# Patient Record
Sex: MALE | Race: WHITE | NOT HISPANIC OR LATINO | Employment: FULL TIME | URBAN - METROPOLITAN AREA
[De-identification: names, ages, dates, MRNs, and addresses within clinical notes are randomized per-mention and may not be internally consistent; named-entity substitution may affect disease eponyms.]

---

## 2018-10-08 ENCOUNTER — OFFICE VISIT (OUTPATIENT)
Dept: INTERNAL MEDICINE | Facility: CLINIC | Age: 38
End: 2018-10-08
Payer: COMMERCIAL

## 2018-10-08 VITALS
SYSTOLIC BLOOD PRESSURE: 128 MMHG | OXYGEN SATURATION: 98 % | HEART RATE: 97 BPM | BODY MASS INDEX: 28.68 KG/M2 | DIASTOLIC BLOOD PRESSURE: 80 MMHG | WEIGHT: 177.69 LBS | TEMPERATURE: 98 F

## 2018-10-08 DIAGNOSIS — M62.830 MUSCLE SPASM OF BACK: Primary | ICD-10-CM

## 2018-10-08 PROCEDURE — 99999 PR PBB SHADOW E&M-EST. PATIENT-LVL IV: CPT | Mod: PBBFAC,,, | Performed by: NURSE PRACTITIONER

## 2018-10-08 PROCEDURE — 99213 OFFICE O/P EST LOW 20 MIN: CPT | Mod: S$GLB,,, | Performed by: NURSE PRACTITIONER

## 2018-10-08 PROCEDURE — 3008F BODY MASS INDEX DOCD: CPT | Mod: CPTII,S$GLB,, | Performed by: NURSE PRACTITIONER

## 2018-10-08 RX ORDER — CYCLOBENZAPRINE HCL 10 MG
10 TABLET ORAL 3 TIMES DAILY PRN
Qty: 30 TABLET | Refills: 0 | Status: SHIPPED | OUTPATIENT
Start: 2018-10-08 | End: 2018-10-18

## 2018-10-08 RX ORDER — METHYLPREDNISOLONE 4 MG/1
TABLET ORAL
Qty: 1 PACKAGE | Refills: 0 | Status: SHIPPED | OUTPATIENT
Start: 2018-10-08

## 2018-10-08 NOTE — PROGRESS NOTES
Subjective:       Patient ID: Jamel Topete is a 37 y.o. male.    Chief Complaint: Back Pain    Mr. Topete presents today for low back pain that radiates to thighs bilaterally. He fell off a ladder in 2008 and has had intermittent back pain, that causes him to seek medical attention, 2-3 yearly since the incident.       Review of Systems   Constitutional: Negative for fever.   HENT: Negative for facial swelling.    Eyes: Negative for visual disturbance.   Respiratory: Negative for shortness of breath.    Cardiovascular: Negative for chest pain.   Gastrointestinal: Negative for diarrhea, nausea and vomiting.   Genitourinary: Negative for dysuria.   Musculoskeletal: Positive for back pain, gait problem and myalgias.   Skin: Negative for rash.   Neurological: Negative for headaches.   Psychiatric/Behavioral: Negative for confusion.       Objective:      Physical Exam   Constitutional: He is oriented to person, place, and time. He appears well-developed and well-nourished. No distress.   HENT:   Head: Atraumatic.   Eyes: No scleral icterus.   Neck: Normal range of motion.   Cardiovascular: Normal rate, regular rhythm and normal heart sounds.   Pulmonary/Chest: Effort normal and breath sounds normal. No stridor. No respiratory distress. He has no wheezes. He has no rales.   Musculoskeletal:        Lumbar back: He exhibits decreased range of motion, tenderness, pain and spasm. He exhibits no bony tenderness, no swelling, no edema, no deformity, no laceration and normal pulse.   Neurological: He is alert and oriented to person, place, and time.   Skin: Skin is warm and dry. He is not diaphoretic.   Psychiatric: He has a normal mood and affect. His behavior is normal.   Nursing note and vitals reviewed.      Assessment:       1. Muscle spasm of back        Plan:   1. Muscle spasm of back  - Ambulatory consult to [facility]  - methylPREDNISolone (MEDROL DOSEPACK) 4 mg tablet; use as directed  Dispense: 1 Package;  Refill: 0  - cyclobenzaprine (FLEXERIL) 10 MG tablet; Take 1 tablet (10 mg total) by mouth 3 (three) times daily as needed for Muscle spasms.  Dispense: 30 tablet; Refill: 0      Pt has been given instructions populated from etouches database and has verbalized understanding of the after visit summary and information contained wherein.    Follow up with a primary care provider. May go to ER for acute shortness of breath, lightheadedness, fever, or any other emergent complaints or changes in condition.

## 2019-06-27 ENCOUNTER — TELEPHONE (OUTPATIENT)
Dept: ORTHOPEDICS | Facility: CLINIC | Age: 39
End: 2019-06-27

## 2019-06-27 DIAGNOSIS — M51.36 DDD (DEGENERATIVE DISC DISEASE), LUMBAR: Primary | ICD-10-CM

## 2019-07-03 ENCOUNTER — OFFICE VISIT (OUTPATIENT)
Dept: ORTHOPEDICS | Facility: CLINIC | Age: 39
End: 2019-07-03
Payer: COMMERCIAL

## 2019-07-03 ENCOUNTER — HOSPITAL ENCOUNTER (OUTPATIENT)
Dept: RADIOLOGY | Facility: HOSPITAL | Age: 39
Discharge: HOME | End: 2019-07-03
Attending: PHYSICIAN ASSISTANT
Payer: COMMERCIAL

## 2019-07-03 VITALS — WEIGHT: 174.63 LBS | HEIGHT: 67 IN | BODY MASS INDEX: 27.41 KG/M2

## 2019-07-03 DIAGNOSIS — M54.16 LUMBAR RADICULOPATHY: Primary | ICD-10-CM

## 2019-07-03 DIAGNOSIS — M51.36 DDD (DEGENERATIVE DISC DISEASE), LUMBAR: ICD-10-CM

## 2019-07-03 DIAGNOSIS — M54.5 ACUTE BILATERAL LOW BACK PAIN, WITH SCIATICA PRESENCE UNSPECIFIED: ICD-10-CM

## 2019-07-03 PROCEDURE — 72100 XR LUMBAR SPINE AP AND LAT WITH FLEX/EXT: ICD-10-PCS | Mod: 26,,, | Performed by: RADIOLOGY

## 2019-07-03 PROCEDURE — 99204 OFFICE O/P NEW MOD 45 MIN: CPT | Mod: S$GLB,,, | Performed by: PHYSICIAN ASSISTANT

## 2019-07-03 PROCEDURE — 99999 PR PBB SHADOW E&M-EST. PATIENT-LVL III: CPT | Mod: PBBFAC,,, | Performed by: PHYSICIAN ASSISTANT

## 2019-07-03 PROCEDURE — 99204 PR OFFICE/OUTPT VISIT, NEW, LEVL IV, 45-59 MIN: ICD-10-PCS | Mod: S$GLB,,, | Performed by: PHYSICIAN ASSISTANT

## 2019-07-03 PROCEDURE — 3008F BODY MASS INDEX DOCD: CPT | Mod: CPTII,S$GLB,, | Performed by: PHYSICIAN ASSISTANT

## 2019-07-03 PROCEDURE — 72100 X-RAY EXAM L-S SPINE 2/3 VWS: CPT | Mod: TC

## 2019-07-03 PROCEDURE — 72120 X-RAY BEND ONLY L-S SPINE: CPT | Mod: 26,,, | Performed by: RADIOLOGY

## 2019-07-03 PROCEDURE — 72120 XR LUMBAR SPINE AP AND LAT WITH FLEX/EXT: ICD-10-PCS | Mod: 26,,, | Performed by: RADIOLOGY

## 2019-07-03 PROCEDURE — 3008F PR BODY MASS INDEX (BMI) DOCUMENTED: ICD-10-PCS | Mod: CPTII,S$GLB,, | Performed by: PHYSICIAN ASSISTANT

## 2019-07-03 PROCEDURE — 72100 X-RAY EXAM L-S SPINE 2/3 VWS: CPT | Mod: 26,,, | Performed by: RADIOLOGY

## 2019-07-03 PROCEDURE — 99999 PR PBB SHADOW E&M-EST. PATIENT-LVL III: ICD-10-PCS | Mod: PBBFAC,,, | Performed by: PHYSICIAN ASSISTANT

## 2019-07-03 RX ORDER — METHOCARBAMOL 750 MG/1
750 TABLET, FILM COATED ORAL 3 TIMES DAILY
Qty: 60 TABLET | Refills: 0 | Status: SHIPPED | OUTPATIENT
Start: 2019-07-03 | End: 2019-07-23

## 2019-07-03 NOTE — PROGRESS NOTES
DATE: 7/3/2019  PATIENT: Jamel Topete    Supervising Physician: Juan Rose M.D.    CHIEF COMPLAINT: low back pain    HISTORY:  Jamel Topete is a 38 y.o. male  here for initial evaluation of low back and bilateral leg pain (Back - 2, Leg - 2).  The pain has been present for about 2 weeks.  He was lifting his daughter out of the pool when the pain began.  The pain was so severe he could barely walk.  He went to an urgent care and they gave him steroids, flexeril and tramadol.  His pain has progressively improved since then but he still has a lingering pain in the low back and left buttock.  He has had similar symptoms before but it is happening more frequently.  The patient describes the pain as sharp.  The pain is worse with sitting and with activity and improved by rest or lying down. There is associated numbness and tingling. There is no subjective weakness. Prior treatments have included a medrol dose pack, tramadol and flexeril, but no PT, ESIs or surgery.    The patient denies myelopathic symptoms such as handwriting changes or difficulty with buttons/coins/keys. Denies perineal paresthesias, bowel/bladder dysfunction.    PAST MEDICAL/SURGICAL HISTORY:  History reviewed. No pertinent past medical history.  History reviewed. No pertinent surgical history.    Medications:   Current Outpatient Medications on File Prior to Visit   Medication Sig Dispense Refill    STELARA 45 mg/0.5 mL Syrg syringe       folic acid (FOLVITE) 1 MG tablet take 1 tablet by mouth once daily EXCEPT ON WEDNESDAY  0    methylPREDNISolone (MEDROL DOSEPACK) 4 mg tablet use as directed 1 Package 0     No current facility-administered medications on file prior to visit.        Social History:   Social History     Socioeconomic History    Marital status:      Spouse name: Not on file    Number of children: Not on file    Years of education: Not on file    Highest education level: Not on file   Occupational History  "   Not on file   Social Needs    Financial resource strain: Not on file    Food insecurity:     Worry: Not on file     Inability: Not on file    Transportation needs:     Medical: Not on file     Non-medical: Not on file   Tobacco Use    Smoking status: Never Smoker    Smokeless tobacco: Never Used   Substance and Sexual Activity    Alcohol use: No    Drug use: Not on file    Sexual activity: Not on file   Lifestyle    Physical activity:     Days per week: Not on file     Minutes per session: Not on file    Stress: Not on file   Relationships    Social connections:     Talks on phone: Not on file     Gets together: Not on file     Attends Orthodoxy service: Not on file     Active member of club or organization: Not on file     Attends meetings of clubs or organizations: Not on file     Relationship status: Not on file   Other Topics Concern    Not on file   Social History Narrative    Not on file       REVIEW OF SYSTEMS:  Constitution: Negative. Negative for chills, fever and night sweats.   Cardiovascular: Negative for chest pain and syncope.   Respiratory: Negative for cough and shortness of breath.   Gastrointestinal: See HPI. Negative for nausea/vomiting. Negative for abdominal pain.  Genitourinary: See HPI. Negative for discoloration or dysuria.  Skin: Negative for dry skin, itching and rash.   Hematologic/Lymphatic: Negative for bleeding problem. Does not bruise/bleed easily.   Musculoskeletal: Negative for falls and muscle weakness.   Neurological: See HPI. No seizures.   Endocrine: Negative for polydipsia, polyphagia and polyuria.   Allergic/Immunologic: Negative for hives and persistent infections.     EXAM:  Ht 5' 6.5" (1.689 m)   Wt 79.2 kg (174 lb 9.7 oz)   BMI 27.76 kg/m²     General: The patient is a very pleasant 38 y.o. male in no apparent distress, the patient is oriented to person, place and time.  Psych: Normal mood and affect  HEENT: Vision grossly intact, hearing intact to the " spoken word.  Lungs: Respirations unlabored.  Gait: Normal station and gait, no difficulty with toe or heel walk.   Skin: Dorsal lumbar skin negative for rashes, lesions, hairy patches and surgical scars. There is mild left sided lumbar tenderness to palpation.  Range of motion: Lumbar range of motion is acceptable.  Spinal Balance: Global saggital and coronal spinal balance acceptable, not significant for scoliosis and kyphosis.  Musculoskeletal: No pain with the range of motion of the bilateral hips. No trochanteric tenderness to palpation.  Vascular: Bilateral lower extremities warm and well perfused, dorsalis pedis pulses 2+ bilaterally.  Neurological: Normal strength and tone in all major motor groups in the bilateral lower extremities. Normal sensation to light touch in the L2-S1 dermatomes bilaterally.  Deep tendon reflexes symmetric 2+ in the bilateral lower extremities.  Negative Babinski bilaterally. Straight leg raise negative bilaterally.    IMAGING:      Today I personally reviewed AP, Lat and Flex/Ex  upright L-spine films that demonstrate normal disc spacing and alignment.   There is limbus vertebra at L5.      Body mass index is 27.76 kg/m².    No results found for: HGBA1C        ASSESSMENT/PLAN:    Jamel was seen today for low-back pain and leg pain.    Diagnoses and all orders for this visit:    Lumbar radiculopathy  -     Ambulatory Referral to Physical/Occupational Therapy    Acute bilateral low back pain, with sciatica presence unspecified  -     Ambulatory Referral to Physical/Occupational Therapy    Other orders  -     methocarbamol (ROBAXIN) 750 MG Tab; Take 1 tablet (750 mg total) by mouth 3 (three) times daily. As needed for muscle spasms for 60 doses      The patient's pain is improving.  Referral for PT placed today.  He cannot take NSAIDs due to allergic reaction.  Follow up after therapy if symptoms persist.  We will consider an MRI at that time.       Follow up if symptoms worsen or  fail to improve.

## 2019-07-31 ENCOUNTER — CLINICAL SUPPORT (OUTPATIENT)
Dept: REHABILITATION | Facility: HOSPITAL | Age: 39
End: 2019-07-31
Payer: COMMERCIAL

## 2019-07-31 DIAGNOSIS — M54.41 ACUTE MIDLINE LOW BACK PAIN WITH BILATERAL SCIATICA: ICD-10-CM

## 2019-07-31 DIAGNOSIS — M54.42 ACUTE MIDLINE LOW BACK PAIN WITH BILATERAL SCIATICA: ICD-10-CM

## 2019-07-31 PROBLEM — M54.50 LOW BACK PAIN: Status: ACTIVE | Noted: 2019-07-31

## 2019-07-31 PROCEDURE — 97161 PT EVAL LOW COMPLEX 20 MIN: CPT | Mod: PO

## 2019-07-31 PROCEDURE — 97110 THERAPEUTIC EXERCISES: CPT | Mod: PO

## 2019-07-31 NOTE — PLAN OF CARE
[facility]  Physical Therapy Initial Evaluation    Name: Jamel Topete  Clinic Number: [id number]    Therapy Diagnosis:   Encounter Diagnosis   Name Primary?    Acute midline low back pain with bilateral sciatica      Physician: Janet Romo PA-C    Physician Orders: PT Eval and Treat low back pain w/ radiculopathy  Medical Diagnosis from Referral: Lumbar radiculopathy, acute bilateral low back pain w/ sciatica presence unspecified  Evaluation Date: 7/31/2019  Authorization Period Expiration: 12/31/19  Plan of Care Expiration: 10/15/19  Visit # / Visits authorized: 1/ 30    Time In: 800  Time Out: 900  Total Billable Time: 60 minutes    Precautions: Standard    Subjective   Date of onset: 6/17/19  History of current condition - Jamel reports: acute onset of LBP w/ bilateral LE radiculopathy on 6/17/19 when lifting his daughter out of the pool. Pt reports for the following 3 days he had a lot of pain walking and spent most of the day in bed, had to drive back from his vacation which was difficult d/t extended period of sitting. Pt endorses bilateral groin pain for 4-5 days after event, denies changes in bowel or bladder function. Pt received steroid taper which helped his sx after 1.5 weeks. Pt admits similar episodes of pain (3x/within the past year). Pt works as an  and sits most of the day but helps his son out as  2 days /week. Pt would like to prevent further episodes of pain from occurring, be able to independently manage sx, and tolerate running/ wrestling practice w/out pain.      No past medical history on file.  Jamel Topete  has no past surgical history on file.    Jamel has a current medication list which includes the following prescription(s): folic acid, methylprednisolone, and stelara.    Review of patient's allergies indicates:   Allergen Reactions    Asa [aspirin] Other (See Comments)     congestion    Advil [ibuprofen] Other (See Comments)  "    congestion        Imaging, X-ray: 6/27/19:  Alignment: Well-maintained.    Vertebrae: Vertebral body heights are maintained.  No suspicious appearing lytic or blastic lesions.    Discs and facets: There is no disc space narrowing.  Mild anterior osteophyte noted at the L4-L5 space.  Facet joints are well-aligned and unremarkable.    Other: There is a normal bowel gas pattern.  The sacroiliac joints and femoroacetabular joints are unremarkable.      Impression       No significant abnormalities.         Prior Therapy: n/a  Social History: Pt lives w/ his family  Occupation: [employer]   Prior Level of Function: Pt independent with all ADLs, job responsibilities and participating in regular physical activity  Current Level of Function: Pt limited w/ ADLs, job responsibilities and avoiding physical activity    Pain:  Current 0/10, worst 5/10, best 0/10   Location: bilateral back  and groin   Description: Aching, Dull, Burning, Throbbing, Grabbing and Shooting  Aggravating Factors: Sitting, Standing, Bending, Lifting and Getting out of bed/chair  Easing Factors: relaxation, pain medication and rest    Pts goals: preventing happening again, independently manage sx and increase core strength    Objective     LUMBAR SPINE AROM:   Flexion: 80% P!   Extension: 50% discomfort   Left Sidebend: WNL "cramp sensation on L"   Right Sidebend: WNL "cramp sensation on R"   Left Rotation: WNL   Right Rotation: WNL     SEGMENTAL MOBILITY: PA glides to L5 reproduce radicular sx to R groin. Pt reports no increase in radicular sx w/ PA to L5 once cued to activate paraspinals    LOWER EXTREMITY PROM: WNL, restricted into IR bilat    LOWER EXTREMITY STRENGTH:   Left Right   Quadriceps 5/5 5/5   Hamstrings 5/5 5/5     Iliopsoas 4/5 4/5   PGM 4-/5 4-/5   Hip IR 4-/5 4+/5   Hip ER 4+/5 4+/5   Hip Ext 4-/5 4-/5     Special Tests:   Left Right   Slump (+) (+)   SLR (+) (-)   SI thrust (+) for R sided SI pain  CHATO (-)  FADDIR (-)  Pelvic " gap/compress (-)    TREATMENT   Treatment Time In: 845  Treatment Time Out: 900  Total Treatment time separate from Evaluation: 15 minutes    Jamel received therapeutic exercises to develop strength, endurance, ROM, flexibility, posture and core stabilization for 15 minutes including:    Pelvic tilt 2x20  LTRs 2x20  Bridge 2x10  Side plank clamshells    Home Exercises and Patient Education Provided    Education provided re: activity modification, red flags req immediate medical attention (saddle anesthesia, changes in bowel/bladder function), roll of PT, POC    Written Home Exercises Provided: All exercises performed during today's treatment were printed and given to pt  Exercises were reviewed and Jamel was able to demonstrate them prior to the end of the session.   Pt received a written copy of exercises to perform at home. Jamel demonstrated good  understanding of the education provided.     Assessment   Jamel is a 38 y.o. male referred to outpatient Physical Therapy with a medical diagnosis of low back pain w/ radiculopathy. Pt presents with decreased strength, decreased ROM, decreased flexibility, faulty posture, and increased pain. Due to impairments, pt is unable to perform job responsibilities, play with his children or tolerate job responsibilities.     Pt prognosis is Excellent.   Pt will benefit from skilled outpatient Physical Therapy to address the deficits stated above and in the chart below, provide pt/family education, and to maximize pt's level of independence.     Plan of care discussed with patient: Yes  Pt's spiritual, cultural and educational needs considered and patient is agreeable to the plan of care and goals as stated below:     Anticipated Barriers for therapy: n/a    Medical Necessity is demonstrated by the following  History  Co-morbidities and personal factors that may impact the plan of care Co-morbidities:   n/a    Personal Factors:   no deficits     low   Examination  Body Structures  and Functions, activity limitations and participation restrictions that may impact the plan of care Body Regions:   back    Body Systems:    ROM  strength  balance  gait  transfers    Participation Restrictions:   Play with chidren    Activity limitations:   Learning and applying knowledge  no deficits    General Tasks and Commands  no deficits    Communication  no deficits    Mobility  lifting and carrying objects  walking  driving (bike, car, motorcycle)    Self care  looking after one's health    Domestic Life  shopping  cooking  Assisting others    Interactions/Relationships  family relationships    Life Areas  employment    Community and Social Life  no deficits         low   Clinical Presentation stable and uncomplicated low   Decision Making/ Complexity Score: low     Goals:  Short Term Goals: 3 weeks   1. Pt will be independent with HEP and report compliance at least 4 days/week  2. Pt will be able to  20# off the floor 10x w/ sound body mechanics reporting less than 2/10 pain  3. Pt will be able to participate in 1 hour of wrestling practice, making modifications when necessary reporting less than 2/10 pain    Long Term Goals: 10 weeks   1. Pt will be able to run for 30 minutes on level surface reporting less than 2/10 pain  2. Pt will be able to participating in wrestling practice at [facility] reporting less than 2/10 pain  3. Pt will be able to play w/ his children at [facility] for 2 hours reporting less than 2/10 pain    Plan   Plan of care Certification: 7/31/2019 to 10/15/19.    Outpatient Physical Therapy 2 times weekly for 10 weeks to include the following interventions: Cervical/Lumbar Traction, Electrical Stimulation TENS, Gait Training, Manual Therapy, Moist Heat/ Ice, Neuromuscular Re-ed, Patient Education, Self Care, Therapeutic Activites, Therapeutic Exercise, Ultrasound and Dry Needling.     Abundio Bourne, PT

## 2019-08-08 NOTE — PROGRESS NOTES
"  Physical Therapy Daily Treatment Note     Name: Jamel Topete  [facility] Number: [id number]    Therapy Diagnosis:   Encounter Diagnosis   Name Primary?    Acute midline low back pain with bilateral sciatica      Physician: Janet Romo PA-C    Visit Date: 8/9/2019       Physician Orders: PT Eval and Treat low back pain w/ radiculopathy  Medical Diagnosis from Referral: Lumbar radiculopathy, acute bilateral low back pain w/ sciatica presence unspecified  Evaluation Date: 7/31/2019  Authorization Period Expiration: 12/31/19  Plan of Care Expiration: 10/15/19  Visit # / Visits authorized: 2/ 30     Time In: 9:57 am  Time Out: 11:00 am  Total Billable Time: 63 minutes (TE-4)     Precautions: Standard    Subjective     Pt reports: he is doing well and has only been having minimal pain. Pt stated the last time he felt radicular pain in his legs was in mid June. Pt stated he mainly wants to decrease his risk of it happening again.   He was somewhat compliant with home exercise program., stating he did it twice since his last session.   Response to previous treatment: no adverse effect  Functional change:     Pain: 1/10  Location: B lower back     Objective     Jamel received therapeutic exercises to develop strength, endurance, ROM, posture and core stabilization for 63 minutes including:      Supine:     Pelvic tilt: 2x20  LTRs: 2x20  Bridge: 3x10  Hip adduction isometric c/ ball : 5'' hold , 2 x 10   Braced marching : 2 x 10   DKTC c/ green swiss ball : 2 x 10     Sidelying:    Side plank with clamshell : 2 x 10 B  Thoracic rotation/open books: 2 x 10 B    Quadruped:     Cat / camel: x 15   Prayer stretch: 3 x 30"     Prone:    Prone prop on forearms : 3'  Prone press ups : 5'' hold x 10    Standing:    Hip hinge against wall: 2 x 10        Home Exercises Provided and Patient Education Provided     Education provided:   - Pt instructed to continue prior HEP and addition of new HEP provided today  - Proper form for " squatting lifting objects off ground    Written Home Exercises Provided: yes.  Exercises were reviewed and Jamel was able to demonstrate them prior to the end of the session.  Jamel demonstrated good  understanding of the education provided.     See EMR under Patient Instructions for exercises provided 8/9/2019.      Assessment     Pt demonstrated a good tolerance to session and progressions made today. Pt tolerated flexion based activities well and was able to complete with no onset of symptoms. Pt also tolerated prone activities well for extension , required some rest breaks due to stiffness with position although no pain or radicular symptoms reported. Pt had slight difficulty with hip/lumbar dissociation which improved with cues. Pt understanding of proper position and form for squatting to pick his daughter up off the ground .   Jamel is progressing well towards his goals.   Pt prognosis is Excellent.     Pt will continue to benefit from skilled outpatient physical therapy to address the deficits listed in the problem list box on initial evaluation, provide pt/family education and to maximize pt's level of independence in the home and community environment.     Pt's spiritual, cultural and educational needs considered and pt agreeable to plan of care and goals.    Anticipated barriers to physical therapy: none    Goals:   Short Term Goals: 3 weeks   1. Pt will be independent with HEP and report compliance at least 4 days/week  2. Pt will be able to  20# off the floor 10x w/ sound body mechanics reporting less than 2/10 pain  3. Pt will be able to participate in 1 hour of wrestling practice, making modifications when necessary reporting less than 2/10 pain     Long Term Goals: 10 weeks   1. Pt will be able to run for 30 minutes on level surface reporting less than 2/10 pain  2. Pt will be able to participating in wrestling practice at [facility] reporting less than 2/10 pain  3. Pt will be able to play w/ his  children at [facility] for 2 hours reporting less than 2/10 pain    Plan     Continue to progress lumbar , hip and core strengthening. Next : shuttle leg press, anti-rotation press    Zuleika Owen, PTA

## 2019-08-09 ENCOUNTER — CLINICAL SUPPORT (OUTPATIENT)
Dept: REHABILITATION | Facility: HOSPITAL | Age: 39
End: 2019-08-09
Payer: COMMERCIAL

## 2019-08-09 DIAGNOSIS — M54.41 ACUTE MIDLINE LOW BACK PAIN WITH BILATERAL SCIATICA: ICD-10-CM

## 2019-08-09 DIAGNOSIS — M54.42 ACUTE MIDLINE LOW BACK PAIN WITH BILATERAL SCIATICA: ICD-10-CM

## 2019-08-09 PROCEDURE — 97110 THERAPEUTIC EXERCISES: CPT | Mod: PO

## 2019-08-16 ENCOUNTER — CLINICAL SUPPORT (OUTPATIENT)
Dept: REHABILITATION | Facility: HOSPITAL | Age: 39
End: 2019-08-16
Payer: COMMERCIAL

## 2019-08-16 DIAGNOSIS — M54.41 ACUTE MIDLINE LOW BACK PAIN WITH BILATERAL SCIATICA: ICD-10-CM

## 2019-08-16 DIAGNOSIS — M54.42 ACUTE MIDLINE LOW BACK PAIN WITH BILATERAL SCIATICA: ICD-10-CM

## 2019-08-16 PROCEDURE — 97110 THERAPEUTIC EXERCISES: CPT | Mod: PO

## 2019-08-16 NOTE — PROGRESS NOTES
"  Physical Therapy Daily Treatment Note     Name: Jamel Topete  Clinic Number: [id number]    Therapy Diagnosis:   Encounter Diagnosis   Name Primary?    Acute midline low back pain with bilateral sciatica      Physician: Janet Romo PA-C    Visit Date: 8/16/2019       Physician Orders: PT Eval and Treat low back pain w/ radiculopathy  Medical Diagnosis from Referral: Lumbar radiculopathy, acute bilateral low back pain w/ sciatica presence unspecified  Evaluation Date: 7/31/2019  Authorization Period Expiration: 12/31/19  Plan of Care Expiration: 10/15/19  Visit # / Visits authorized: 3/ 30     Time In: 10:00 am  Time Out: 11:00 am  Total Billable Time: 60 minutes (TE-4)     Precautions: Standard    Subjective     Pt reports: he has been doing well with HEP, decreased pain. Pt reports at times tough to get to all exercises d/t work. Spending a lot of time on computer   He was somewhat compliant with home exercise program., stating he did it twice since his last session.   Response to previous treatment: no adverse effect  Functional change:     Pain: 1/10  Location: B lower back     Objective     Jamel received therapeutic exercises to develop strength, endurance, ROM, posture and core stabilization for 63 minutes including:      Supine:     Pelvic tilt: 2x20  LTRs: 2x20  Bridge: 3x10  Hip adduction isometric c/ ball : 5'' hold , 2 x 10   Deadbug : 2 x 10   DKTC c/ green swiss ball : 2 x 10   Thoracic extension over 1/2 FR 3 min    Sidelying:    Side plank with clamshell : 2 x 10 B  Thoracic rotation/open books: 2 x 10 B    Quadruped:     Cat / camel: x 15   Prayer stretch: 3 x 30"   Thoracic rotation in quadruped 2x10    Prone:    Prone prop on forearms : 3'  Prone press ups : 5'' hold x 10    Standing:    Hip hinge against wall: 2 x 10  Thoracic extension at wall 5x5"  Thoracic rotation at wall 2x10  Doorway pec stretch 2x30 sec    Home Exercises Provided and Patient Education Provided     Education " provided:   - Pt instructed to continue prior HEP and addition of new HEP provided today  - Proper form for squatting lifting objects off ground    Written Home Exercises Provided: yes.  Exercises were reviewed and Jamel was able to demonstrate them prior to the end of the session.  Jamel demonstrated good  understanding of the education provided.     See EMR under Patient Instructions for exercises provided 8/9/2019.      Assessment     Pt presents w/ good carryover of exercise form. Required minimal VCs for hip hinge form. Pt given HEP focusing on what to do if he has to do a lot of work sitting at a desk. Pt educated on desk ergonomics. Will continue to progress ROM/strengthening as tolerated.   Jamel is progressing well towards his goals.   Pt prognosis is Excellent.     Pt will continue to benefit from skilled outpatient physical therapy to address the deficits listed in the problem list box on initial evaluation, provide pt/family education and to maximize pt's level of independence in the home and community environment.     Pt's spiritual, cultural and educational needs considered and pt agreeable to plan of care and goals.    Anticipated barriers to physical therapy: none    Goals:   Short Term Goals: 3 weeks   1. Pt will be independent with HEP and report compliance at least 4 days/week  2. Pt will be able to  20# off the floor 10x w/ sound body mechanics reporting less than 2/10 pain  3. Pt will be able to participate in 1 hour of wrestling practice, making modifications when necessary reporting less than 2/10 pain     Long Term Goals: 10 weeks   1. Pt will be able to run for 30 minutes on level surface reporting less than 2/10 pain  2. Pt will be able to participating in wrestling practice at [facility] reporting less than 2/10 pain  3. Pt will be able to play w/ his children at [facility] for 2 hours reporting less than 2/10 pain    Plan     Continue to progress lumbar , hip and core strengthening. Next :  shuttle leg press, anti-rotation press    Abundio Bourne, PT

## 2019-09-05 NOTE — PROGRESS NOTES
Physical Therapy Daily Treatment Note     Name: Jamel Topete  Clinic Number: [id number]    Therapy Diagnosis:   Encounter Diagnosis   Name Primary?    Acute midline low back pain with bilateral sciatica      Physician: Janet Romo PA-C    Visit Date: 9/6/2019       Physician Orders: PT Eval and Treat low back pain w/ radiculopathy  Medical Diagnosis from Referral: Lumbar radiculopathy, acute bilateral low back pain w/ sciatica presence unspecified  Evaluation Date: 7/31/2019  Authorization Period Expiration: 12/31/19  Plan of Care Expiration: 10/15/19  Visit # / Visits authorized: 4/ 30     Time In: 9:00 am  Time Out: 10:00 am  Total Billable Time: 60 minutes (TE-4)     Precautions: Standard    Subjective     Pt reports: he has tried to take regular breaks from his desk. Feeling like he is near PLOF, wants to increase physical activity by getting back to running but worried about shin splints  He was  More compliant with home exercise program.,  Response to previous treatment: no adverse effect  Functional change: more comfortable picking things off the floor    Pain: 1/10  Location: R lower back     Objective     LUMBAR SPINE AROM:   Flexion: 80%    Extension: 50%   Left Sidebend: WNL   Right Sidebend: WNL discomfort   Left Rotation: WNL   Right Rotation: WNL      SEGMENTAL MOBILITY: PA glides to L5 reproduce radicular sx to R groin. Pt reports no increase in radicular sx w/ PA to L5 once cued to activate paraspinals     LOWER EXTREMITY PROM: WNL, restricted into IR bilat     LOWER EXTREMITY STRENGTH:    Left Right   Quadriceps 5/5 5/5   Hamstrings 5/5 5/5      Iliopsoas 4/5 4/5   PGM 4-/5 4-/5   Hip IR 4-/5 4+/5   Hip ER 4+/5 4+/5   Hip Ext 4/5 4/5      Special Tests:    Left Right   Slump (+) (+)   SLR (+) (-)     Jamel received therapeutic exercises to develop strength, endurance, ROM, posture and core stabilization for 63 minutes including:      Supine:     Pelvic tilt: 2x20  LTRs: 2x20  Bridge w/ BTB:  "3x10  Hip adduction isometric c/ ball : 5'' hold , 2 x 10   Deadbug : 2 x 10   DKTC c/ green swiss ball : 2 x 10   Thoracic extension over 1/2 FR 3 min  Supine crawling    Sidelying:    Side plank with clamshell : 2 x 10 B  Thoracic rotation/open books: 2 x 10 B    Quadruped:     Cat / camel: x 15   Prayer stretch: 3 x 30"   Thoracic rotation in quadruped 2x10    Prone:    Prone prop on forearms : 3'  Prone press ups : 5'' hold x 10    Standing:    Hip hinge against wall BTB at knees: 2 x 10  Hip hinge w/ red sports cord 2x10  Hip hinge w/ red sports cord & 25# weight 2x10  BTB side steps 2 laps  BTB monster walk 2 laps  Running form match strike 2x5  Jogging working on james w/ metronome cue 5 min  Inch worm 2x10 ft  Anti rot press w/ squat 2x10  Thoracic extension at wall 5x5"  Thoracic rotation at wall 2x10  Doorway pec stretch 2x30 sec    Home Exercises Provided and Patient Education Provided     Education provided:   - Pt instructed to continue prior HEP and addition of new HEP provided today  - Proper form for squatting lifting objects off ground    Written Home Exercises Provided: yes.  Exercises were reviewed and Jamel was able to demonstrate them prior to the end of the session.  Jamel demonstrated good  understanding of the education provided.     See EMR under Patient Instructions for exercises provided 8/9/2019.      Assessment     Pt demonstrates improved hip hinge form, requires fewer cues to achieve posterior weight shift. Pt benefits from continued core / hip strengthening. Discussed running form with pt, james and proper warm up prior to going for run. Pt demonstrates good understanding. Potential d/c after next treatment depending on how independent HEP goes  Jamel is progressing well towards his goals.   Pt prognosis is Excellent.     Pt will continue to benefit from skilled outpatient physical therapy to address the deficits listed in the problem list box on initial evaluation, provide " pt/family education and to maximize pt's level of independence in the home and community environment.     Pt's spiritual, cultural and educational needs considered and pt agreeable to plan of care and goals.    Anticipated barriers to physical therapy: none    Goals:   Short Term Goals: 3 weeks   1. Pt will be independent with HEP and report compliance at least 4 days/week - met  2. Pt will be able to  20# off the floor 10x w/ sound body mechanics reporting less than 2/10 pain - met  3. Pt will be able to participate in 1 hour of wrestling practice, making modifications when necessary reporting less than 2/10 pain -----progressing not met 9/6/2019     Long Term Goals: 10 weeks   1. Pt will be able to run for 30 minutes on level surface reporting less than 2/10 pain -----progressing not met 9/6/2019  2. Pt will be able to participating in wrestling practice at [facility] reporting less than 2/10 pain -----progressing not met 9/6/2019\  3. Pt will be able to play w/ his children at [facility] for 2 hours reporting less than 2/10 pain -----progressing not met 9/6/2019    Plan     Continue to progress lumbar , hip and core strengthening. Next : shuttle leg press, anti-rotation press    Abundio Bourne, PT

## 2019-09-06 ENCOUNTER — CLINICAL SUPPORT (OUTPATIENT)
Dept: REHABILITATION | Facility: HOSPITAL | Age: 39
End: 2019-09-06
Payer: COMMERCIAL

## 2019-09-06 DIAGNOSIS — M54.42 ACUTE MIDLINE LOW BACK PAIN WITH BILATERAL SCIATICA: ICD-10-CM

## 2019-09-06 DIAGNOSIS — M54.41 ACUTE MIDLINE LOW BACK PAIN WITH BILATERAL SCIATICA: ICD-10-CM

## 2019-09-06 PROCEDURE — 97110 THERAPEUTIC EXERCISES: CPT | Mod: PO

## 2021-03-19 ENCOUNTER — IMMUNIZATION (OUTPATIENT)
Dept: FAMILY MEDICINE | Facility: CLINIC | Age: 41
End: 2021-03-19
Payer: COMMERCIAL

## 2021-03-19 DIAGNOSIS — Z23 NEED FOR VACCINATION: Primary | ICD-10-CM

## 2021-03-19 PROCEDURE — 91300 COVID-19, MRNA, LNP-S, PF, 30 MCG/0.3 ML DOSE VACCINE: CPT | Mod: PBBFAC | Performed by: FAMILY MEDICINE

## 2021-04-09 ENCOUNTER — IMMUNIZATION (OUTPATIENT)
Dept: FAMILY MEDICINE | Facility: CLINIC | Age: 41
End: 2021-04-09
Payer: COMMERCIAL

## 2021-04-09 DIAGNOSIS — Z23 NEED FOR VACCINATION: Primary | ICD-10-CM

## 2021-04-09 PROCEDURE — 0002A COVID-19, MRNA, LNP-S, PF, 30 MCG/0.3 ML DOSE VACCINE: ICD-10-PCS | Mod: CV19,S$GLB,, | Performed by: FAMILY MEDICINE

## 2021-04-09 PROCEDURE — 0002A COVID-19, MRNA, LNP-S, PF, 30 MCG/0.3 ML DOSE VACCINE: CPT | Mod: CV19,S$GLB,, | Performed by: FAMILY MEDICINE

## 2021-04-09 PROCEDURE — 91300 COVID-19, MRNA, LNP-S, PF, 30 MCG/0.3 ML DOSE VACCINE: ICD-10-PCS | Mod: S$GLB,,, | Performed by: FAMILY MEDICINE

## 2021-04-09 PROCEDURE — 91300 COVID-19, MRNA, LNP-S, PF, 30 MCG/0.3 ML DOSE VACCINE: CPT | Mod: S$GLB,,, | Performed by: FAMILY MEDICINE

## 2024-09-03 ENCOUNTER — OFFICE VISIT (OUTPATIENT)
Dept: PRIMARY CARE CLINIC | Facility: CLINIC | Age: 44
End: 2024-09-03
Payer: COMMERCIAL

## 2024-09-03 ENCOUNTER — LAB VISIT (OUTPATIENT)
Dept: LAB | Facility: HOSPITAL | Age: 44
End: 2024-09-03
Attending: FAMILY MEDICINE
Payer: COMMERCIAL

## 2024-09-03 VITALS
RESPIRATION RATE: 20 BRPM | WEIGHT: 183.44 LBS | TEMPERATURE: 98 F | HEIGHT: 67 IN | SYSTOLIC BLOOD PRESSURE: 132 MMHG | BODY MASS INDEX: 28.79 KG/M2 | OXYGEN SATURATION: 98 % | HEART RATE: 69 BPM | DIASTOLIC BLOOD PRESSURE: 78 MMHG

## 2024-09-03 DIAGNOSIS — Z12.83 SKIN CANCER SCREENING: ICD-10-CM

## 2024-09-03 DIAGNOSIS — L40.9 PSORIASIS: ICD-10-CM

## 2024-09-03 DIAGNOSIS — N50.82 SCROTAL PAIN: ICD-10-CM

## 2024-09-03 DIAGNOSIS — Z00.00 WELL ADULT EXAM: Primary | ICD-10-CM

## 2024-09-03 DIAGNOSIS — Z00.00 WELL ADULT EXAM: ICD-10-CM

## 2024-09-03 DIAGNOSIS — F32.1 CURRENT MODERATE EPISODE OF MAJOR DEPRESSIVE DISORDER WITHOUT PRIOR EPISODE: ICD-10-CM

## 2024-09-03 LAB
ALBUMIN SERPL BCP-MCNC: 4.2 G/DL (ref 3.5–5.2)
ALP SERPL-CCNC: 51 U/L (ref 55–135)
ALT SERPL W/O P-5'-P-CCNC: 35 U/L (ref 10–44)
ANION GAP SERPL CALC-SCNC: 10 MMOL/L (ref 8–16)
AST SERPL-CCNC: 21 U/L (ref 10–40)
BILIRUB SERPL-MCNC: 0.7 MG/DL (ref 0.1–1)
BUN SERPL-MCNC: 18 MG/DL (ref 6–20)
CALCIUM SERPL-MCNC: 9.4 MG/DL (ref 8.7–10.5)
CHLORIDE SERPL-SCNC: 107 MMOL/L (ref 95–110)
CHOLEST SERPL-MCNC: 205 MG/DL (ref 120–199)
CHOLEST/HDLC SERPL: 3.7 {RATIO} (ref 2–5)
CO2 SERPL-SCNC: 20 MMOL/L (ref 23–29)
COMPLEXED PSA SERPL-MCNC: 0.62 NG/ML (ref 0–4)
CREAT SERPL-MCNC: 0.9 MG/DL (ref 0.5–1.4)
ERYTHROCYTE [DISTWIDTH] IN BLOOD BY AUTOMATED COUNT: 13.4 % (ref 11.5–14.5)
EST. GFR  (NO RACE VARIABLE): >60 ML/MIN/1.73 M^2
ESTIMATED AVG GLUCOSE: 103 MG/DL (ref 68–131)
GLUCOSE SERPL-MCNC: 98 MG/DL (ref 70–110)
HBA1C MFR BLD: 5.2 % (ref 4–5.6)
HCT VFR BLD AUTO: 45.1 % (ref 40–54)
HDLC SERPL-MCNC: 55 MG/DL (ref 40–75)
HDLC SERPL: 26.8 % (ref 20–50)
HGB BLD-MCNC: 14.9 G/DL (ref 14–18)
LDLC SERPL CALC-MCNC: 123.8 MG/DL (ref 63–159)
MCH RBC QN AUTO: 30 PG (ref 27–31)
MCHC RBC AUTO-ENTMCNC: 33 G/DL (ref 32–36)
MCV RBC AUTO: 91 FL (ref 82–98)
NONHDLC SERPL-MCNC: 150 MG/DL
PLATELET # BLD AUTO: 267 K/UL (ref 150–450)
PMV BLD AUTO: 10.6 FL (ref 9.2–12.9)
POTASSIUM SERPL-SCNC: 3.8 MMOL/L (ref 3.5–5.1)
PROT SERPL-MCNC: 7.1 G/DL (ref 6–8.4)
RBC # BLD AUTO: 4.97 M/UL (ref 4.6–6.2)
SODIUM SERPL-SCNC: 137 MMOL/L (ref 136–145)
TRIGL SERPL-MCNC: 131 MG/DL (ref 30–150)
TSH SERPL DL<=0.005 MIU/L-ACNC: 1.4 UIU/ML (ref 0.4–4)
WBC # BLD AUTO: 8.15 K/UL (ref 3.9–12.7)

## 2024-09-03 PROCEDURE — 83036 HEMOGLOBIN GLYCOSYLATED A1C: CPT | Performed by: FAMILY MEDICINE

## 2024-09-03 PROCEDURE — 85027 COMPLETE CBC AUTOMATED: CPT | Performed by: FAMILY MEDICINE

## 2024-09-03 PROCEDURE — 80053 COMPREHEN METABOLIC PANEL: CPT | Performed by: FAMILY MEDICINE

## 2024-09-03 PROCEDURE — 3075F SYST BP GE 130 - 139MM HG: CPT | Mod: CPTII,S$GLB,, | Performed by: FAMILY MEDICINE

## 2024-09-03 PROCEDURE — 99999 PR PBB SHADOW E&M-NEW PATIENT-LVL V: CPT | Mod: PBBFAC,,, | Performed by: FAMILY MEDICINE

## 2024-09-03 PROCEDURE — 1159F MED LIST DOCD IN RCRD: CPT | Mod: CPTII,S$GLB,, | Performed by: FAMILY MEDICINE

## 2024-09-03 PROCEDURE — 84153 ASSAY OF PSA TOTAL: CPT | Performed by: FAMILY MEDICINE

## 2024-09-03 PROCEDURE — 3008F BODY MASS INDEX DOCD: CPT | Mod: CPTII,S$GLB,, | Performed by: FAMILY MEDICINE

## 2024-09-03 PROCEDURE — 1160F RVW MEDS BY RX/DR IN RCRD: CPT | Mod: CPTII,S$GLB,, | Performed by: FAMILY MEDICINE

## 2024-09-03 PROCEDURE — 84443 ASSAY THYROID STIM HORMONE: CPT | Performed by: FAMILY MEDICINE

## 2024-09-03 PROCEDURE — 3044F HG A1C LEVEL LT 7.0%: CPT | Mod: CPTII,S$GLB,, | Performed by: FAMILY MEDICINE

## 2024-09-03 PROCEDURE — 99214 OFFICE O/P EST MOD 30 MIN: CPT | Mod: 25,S$GLB,, | Performed by: FAMILY MEDICINE

## 2024-09-03 PROCEDURE — 80061 LIPID PANEL: CPT | Performed by: FAMILY MEDICINE

## 2024-09-03 PROCEDURE — 99396 PREV VISIT EST AGE 40-64: CPT | Mod: S$GLB,,, | Performed by: FAMILY MEDICINE

## 2024-09-03 PROCEDURE — 3078F DIAST BP <80 MM HG: CPT | Mod: CPTII,S$GLB,, | Performed by: FAMILY MEDICINE

## 2024-09-03 RX ORDER — FINASTERIDE 1 MG/1
1 TABLET, FILM COATED ORAL DAILY
COMMUNITY
Start: 2023-08-01

## 2024-09-03 RX ORDER — CLOBETASOL PROPIONATE 0.5 MG/G
1 OINTMENT TOPICAL
COMMUNITY

## 2024-09-03 RX ORDER — SERTRALINE HYDROCHLORIDE 50 MG/1
50 TABLET, FILM COATED ORAL DAILY
COMMUNITY
Start: 2024-06-01 | End: 2024-09-03

## 2024-09-03 RX ORDER — FLUOXETINE HYDROCHLORIDE 20 MG/1
20 CAPSULE ORAL DAILY
Qty: 30 CAPSULE | Refills: 1 | Status: SHIPPED | OUTPATIENT
Start: 2024-09-03 | End: 2025-09-03

## 2024-09-03 NOTE — PROGRESS NOTES
"      /78 (BP Location: Left arm, Patient Position: Sitting)   Pulse 69   Temp 97.7 °F (36.5 °C) (Oral)   Resp 20   Ht 5' 6.5" (1.689 m)   Wt 83.2 kg (183 lb 6.8 oz)   SpO2 98%   BMI 29.16 kg/m²       ===========              Jamel Topete is a 43 y.o. male     here for    Annual exam.    Health maintenance reviewed with patient in detail inc any recent labs and studies and needs for future screening labs.  Age-appropriate vaccines and other age-appropriate screening studies reviewed with patient in detail.  Sleep health reviewed with patient.  Skin health regarding possible skin cancer screening reviewed with patient.  General regularity of bowel movements and urinations reviewed with patient including any possibility of urine leakage.  Vision screening reviewed with patient.        Patient Active Problem List   Diagnosis    Low back pain    Current moderate episode of major depressive disorder without prior episode    Psoriasis    Scrotal pain       SURGICAL AND MEDICAL HISTORY: updated and reviewed.  History reviewed. No pertinent surgical history.  ALLERGIES updated and reviewed.  Review of patient's allergies indicates:   Allergen Reactions    Asa [aspirin] Other (See Comments)     congestion    Advil [ibuprofen] Other (See Comments)     congestion       CURRENT OUTPATIENT MEDICATIONS updated and reviewed    Current Outpatient Medications:     clobetasol 0.05% (TEMOVATE) 0.05 % Oint, Apply 1 Application topically as needed., Disp: , Rfl:     finasteride (PROPECIA) 1 mg tablet, Take 1 tablet by mouth once daily., Disp: , Rfl:     STELARA 45 mg/0.5 mL Syrg syringe, , Disp: , Rfl:     diphth,pertus,acell,,tetanus (BOOSTRIX) 2.5-8-5 Lf-mcg-Lf/0.5mL Syrg injection, Inject 0.5 mLs into the muscle once. for 1 dose, Disp: 0.5 mL, Rfl: 0    FLUoxetine 20 MG capsule, Take 1 capsule (20 mg total) by mouth once daily., Disp: 30 capsule, Rfl: 1    Review of Systems   Constitutional:  Negative for activity " "change, appetite change, chills, diaphoresis, fatigue, fever and unexpected weight change.   HENT:  Negative for congestion, ear discharge, ear pain, facial swelling, hearing loss, nosebleeds, postnasal drip, rhinorrhea, sinus pressure, sneezing, sore throat, tinnitus, trouble swallowing and voice change.    Eyes:  Negative for photophobia, pain, discharge, redness, itching and visual disturbance.   Respiratory:  Negative for cough, chest tightness, shortness of breath and wheezing.    Cardiovascular:  Negative for chest pain, palpitations and leg swelling.   Gastrointestinal:  Negative for abdominal distention, abdominal pain, anal bleeding, blood in stool, constipation, diarrhea, nausea, rectal pain and vomiting.   Endocrine: Negative for cold intolerance, heat intolerance, polydipsia, polyphagia and polyuria.   Genitourinary:  Negative for difficulty urinating, dysuria and flank pain.   Musculoskeletal:  Negative for arthralgias, back pain, joint swelling, myalgias and neck pain.   Skin:  Negative for rash.   Neurological:  Negative for dizziness, tremors, seizures, syncope, speech difficulty, weakness, light-headedness, numbness and headaches.   Psychiatric/Behavioral:  Negative for behavioral problems, confusion, decreased concentration, dysphoric mood, sleep disturbance and suicidal ideas. The patient is not nervous/anxious and is not hyperactive.        /78 (BP Location: Left arm, Patient Position: Sitting)   Pulse 69   Temp 97.7 °F (36.5 °C) (Oral)   Resp 20   Ht 5' 6.5" (1.689 m)   Wt 83.2 kg (183 lb 6.8 oz)   SpO2 98%   BMI 29.16 kg/m²   Physical Exam  Vitals and nursing note reviewed.   Constitutional:       General: He is not in acute distress.     Appearance: Normal appearance. He is well-developed. He is not ill-appearing or toxic-appearing.   HENT:      Head: Normocephalic and atraumatic.      Right Ear: Tympanic membrane, ear canal and external ear normal.      Left Ear: Tympanic " membrane, ear canal and external ear normal.      Nose: Nose normal.      Mouth/Throat:      Lips: Pink.      Mouth: Mucous membranes are moist.      Pharynx: No oropharyngeal exudate or posterior oropharyngeal erythema.   Eyes:      General: No scleral icterus.        Right eye: No discharge.         Left eye: No discharge.      Extraocular Movements: Extraocular movements intact.      Conjunctiva/sclera: Conjunctivae normal.   Cardiovascular:      Rate and Rhythm: Normal rate and regular rhythm.      Pulses: Normal pulses.      Heart sounds: Normal heart sounds. No murmur heard.  Pulmonary:      Effort: Pulmonary effort is normal. No respiratory distress.      Breath sounds: Normal breath sounds. No wheezing or rales.   Abdominal:      General: Bowel sounds are normal. There is no distension.      Palpations: Abdomen is soft. There is no mass.      Tenderness: There is no abdominal tenderness. There is no right CVA tenderness, left CVA tenderness, guarding or rebound.      Hernia: No hernia is present.   Musculoskeletal:      Cervical back: Normal range of motion and neck supple. No rigidity or tenderness.   Lymphadenopathy:      Cervical: No cervical adenopathy.   Skin:     General: Skin is warm and dry.   Neurological:      General: No focal deficit present.      Mental Status: He is alert. Mental status is at baseline.   Psychiatric:         Mood and Affect: Mood normal.         Behavior: Behavior normal. Behavior is cooperative.         ASSESSMENT/PLAN    Diagnoses and all orders for this visit:    Well adult exam  -     PSA, Screening; Future  -     CBC Without Differential; Future  -     Comprehensive Metabolic Panel; Future  -     Hemoglobin A1C; Future  -     Lipid Panel; Future  -     TSH; Future        Skin cancer screening  -     Ambulatory referral/consult to Dermatology; Future              Most recent some lab results reviewed with patient.  Any new prescription medications gone over in detail  including reason for taking the medication, most common possible side effects and possible costs, etcetera.    Chronic conditions updated. Other than changes or additions as above, cont current medications and maintain follow-up with specialists if indicated.     Dyllan Lopez MD  A dictation device was used to produce this document. Use of such devices sometimes results in grammatical errors or replacement of words that sound similarly.      ==================    ========================  Patient presents with acute complaints today in the setting of a wellness office visit.  The below portion documents patient's acute complaint(s)  addressed within the primary visit for annual preventative visit    HPI: Pt complains of depressive symptoms.  Denies suicidal or homicidal ideations.  Has had struggles with this for some time.  He has lot of stress.  He has a wife and 2 young sons who are healthy.    He also has some scrotal discomfort.  No trauma.  No penis discharge.  No dysuria.  No perineal discomfort.    He also has chronic psoriasis.      Patient queried and denies any further complaints      Patient Active Problem List   Diagnosis    Low back pain    Current moderate episode of major depressive disorder without prior episode    Psoriasis    Scrotal pain       SURGICAL AND MEDICAL HISTORY: updated and reviewed.  History reviewed. No pertinent surgical history.  ALLERGIES updated and reviewed.  Review of patient's allergies indicates:   Allergen Reactions    Asa [aspirin] Other (See Comments)     congestion    Advil [ibuprofen] Other (See Comments)     congestion       CURRENT OUTPATIENT MEDICATIONS updated and reviewed    Current Outpatient Medications:     clobetasol 0.05% (TEMOVATE) 0.05 % Oint, Apply 1 Application topically as needed., Disp: , Rfl:     finasteride (PROPECIA) 1 mg tablet, Take 1 tablet by mouth once daily., Disp: , Rfl:     STELARA 45 mg/0.5 mL Syrg syringe, , Disp: , Rfl:      "diphth,pertus,acell,,tetanus (BOOSTRIX) 2.5-8-5 Lf-mcg-Lf/0.5mL Syrg injection, Inject 0.5 mLs into the muscle once. for 1 dose, Disp: 0.5 mL, Rfl: 0    FLUoxetine 20 MG capsule, Take 1 capsule (20 mg total) by mouth once daily., Disp: 30 capsule, Rfl: 1    Review of Systems   Constitutional:  Negative for activity change, appetite change, chills, diaphoresis, fatigue, fever and unexpected weight change.   HENT:  Negative for congestion, ear discharge, ear pain, facial swelling, hearing loss, nosebleeds, postnasal drip, rhinorrhea, sinus pressure, sneezing, sore throat, tinnitus, trouble swallowing and voice change.    Eyes:  Negative for photophobia, pain, discharge, redness, itching and visual disturbance.   Respiratory:  Negative for cough, chest tightness, shortness of breath and wheezing.    Cardiovascular:  Negative for chest pain, palpitations and leg swelling.   Gastrointestinal:  Negative for abdominal distention, abdominal pain, anal bleeding, blood in stool, constipation, diarrhea, nausea, rectal pain and vomiting.   Endocrine: Negative for cold intolerance, heat intolerance, polydipsia, polyphagia and polyuria.   Genitourinary:  Negative for difficulty urinating, dysuria and flank pain.   Musculoskeletal:  Negative for arthralgias, back pain, joint swelling, myalgias and neck pain.   Skin:  Negative for rash.   Neurological:  Negative for dizziness, tremors, seizures, syncope, speech difficulty, weakness, light-headedness, numbness and headaches.   Psychiatric/Behavioral:  Negative for behavioral problems, confusion, decreased concentration, dysphoric mood, sleep disturbance and suicidal ideas. The patient is not nervous/anxious and is not hyperactive.        /78 (BP Location: Left arm, Patient Position: Sitting)   Pulse 69   Temp 97.7 °F (36.5 °C) (Oral)   Resp 20   Ht 5' 6.5" (1.689 m)   Wt 83.2 kg (183 lb 6.8 oz)   SpO2 98%   BMI 29.16 kg/m²   Physical Exam  Vitals and nursing note " reviewed.   Constitutional:       General: He is not in acute distress.     Appearance: Normal appearance. He is well-developed. He is not ill-appearing or toxic-appearing.   HENT:      Head: Normocephalic and atraumatic.      Right Ear: Tympanic membrane, ear canal and external ear normal.      Left Ear: Tympanic membrane, ear canal and external ear normal.      Nose: Nose normal.      Mouth/Throat:      Lips: Pink.      Mouth: Mucous membranes are moist.      Pharynx: No oropharyngeal exudate or posterior oropharyngeal erythema.   Eyes:      General: No scleral icterus.        Right eye: No discharge.         Left eye: No discharge.      Extraocular Movements: Extraocular movements intact.      Conjunctiva/sclera: Conjunctivae normal.   Cardiovascular:      Rate and Rhythm: Normal rate and regular rhythm.      Pulses: Normal pulses.      Heart sounds: Normal heart sounds. No murmur heard.  Pulmonary:      Effort: Pulmonary effort is normal. No respiratory distress.      Breath sounds: Normal breath sounds. No wheezing or rales.   Abdominal:      General: Bowel sounds are normal. There is no distension.      Palpations: Abdomen is soft. There is no mass.      Tenderness: There is no abdominal tenderness. There is no right CVA tenderness, left CVA tenderness, guarding or rebound.      Hernia: No hernia is present.   Musculoskeletal:      Cervical back: Normal range of motion and neck supple. No rigidity or tenderness.   Lymphadenopathy:      Cervical: No cervical adenopathy.   Skin:     General: Skin is warm and dry.   Neurological:      General: No focal deficit present.      Mental Status: He is alert. Mental status is at baseline.   Psychiatric:         Mood and Affect: Mood normal.         Behavior: Behavior normal. Behavior is cooperative.         ASSESSMENT/PLAN    Diagnoses and all orders for this visit:        Current moderate episode of major depressive disorder without prior episode  -     Ambulatory  referral/consult to Psychology; Future  -     Ambulatory referral/consult to Psychiatry; Future  Start fluoxetine.  Follow-up with me in 4-6 weeks if not able to see Psychiatry by that time which is not likely because of scheduling.  Psoriasis  Ambulatory referral to Dermatology.  Monitor.  Scrotal pain  -     US Scrotum And Testicles; Future  Check ultrasound.  If abnormal then refer.  Skin cancer screening  -     Ambulatory referral/consult to Dermatology; Future              Most recent some lab results reviewed with patient.  Any new prescription medications gone over in detail including reason for taking the medication, most common possible side effects and possible costs, etcetera.    Chronic conditions updated. Other than changes or additions as above, cont current medications and maintain follow-up with specialists if indicated.     Dyllan Lopez MD  A dictation device was used to produce this document. Use of such devices sometimes results in grammatical errors or replacement of words that sound similarly.      ==================

## 2024-09-04 ENCOUNTER — DOCUMENTATION ONLY (OUTPATIENT)
Dept: PRIMARY CARE CLINIC | Facility: CLINIC | Age: 44
End: 2024-09-04
Payer: COMMERCIAL

## 2024-09-04 PROBLEM — N50.82 SCROTAL PAIN: Status: ACTIVE | Noted: 2024-09-04

## 2024-09-04 PROBLEM — F32.1 CURRENT MODERATE EPISODE OF MAJOR DEPRESSIVE DISORDER WITHOUT PRIOR EPISODE: Status: ACTIVE | Noted: 2024-09-04

## 2024-09-04 PROBLEM — L40.9 PSORIASIS: Status: ACTIVE | Noted: 2024-09-04

## 2024-09-04 NOTE — PROGRESS NOTES
There were no vitals taken for this visit.      ===========              Jamel Topete is a 43 y.o. male     here for    Annual exam.    Health maintenance reviewed with patient in detail inc any recent labs and studies and needs for future screening labs.  Age-appropriate vaccines and other age-appropriate screening studies reviewed with patient in detail.  Sleep health reviewed with patient.  Skin health regarding possible skin cancer screening reviewed with patient.  General regularity of bowel movements and urinations reviewed with patient including any possibility of urine leakage.  Vision screening reviewed with patient.      Patient queried and denies any further complaints      Patient Active Problem List   Diagnosis    Low back pain       SURGICAL AND MEDICAL HISTORY: updated and reviewed.  No past surgical history on file.  ALLERGIES updated and reviewed.  Review of patient's allergies indicates:   Allergen Reactions    Asa [aspirin] Other (See Comments)     congestion    Advil [ibuprofen] Other (See Comments)     congestion       CURRENT OUTPATIENT MEDICATIONS updated and reviewed    Current Outpatient Medications:     clobetasol 0.05% (TEMOVATE) 0.05 % Oint, Apply 1 Application topically as needed., Disp: , Rfl:     diphth,pertus,acell,,tetanus (BOOSTRIX) 2.5-8-5 Lf-mcg-Lf/0.5mL Syrg injection, Inject 0.5 mLs into the muscle once. for 1 dose, Disp: 0.5 mL, Rfl: 0    finasteride (PROPECIA) 1 mg tablet, Take 1 tablet by mouth once daily., Disp: , Rfl:     FLUoxetine 20 MG capsule, Take 1 capsule (20 mg total) by mouth once daily., Disp: 30 capsule, Rfl: 1    STELARA 45 mg/0.5 mL Syrg syringe, , Disp: , Rfl:     Review of Systems   Constitutional:  Negative for activity change, appetite change, chills, diaphoresis, fatigue, fever and unexpected weight change.   HENT:  Negative for congestion, ear discharge, ear pain, facial swelling, hearing loss, nosebleeds, postnasal drip, rhinorrhea, sinus  pressure, sneezing, sore throat, tinnitus, trouble swallowing and voice change.    Eyes:  Negative for photophobia, pain, discharge, redness, itching and visual disturbance.   Respiratory:  Negative for cough, chest tightness, shortness of breath and wheezing.    Cardiovascular:  Negative for chest pain, palpitations and leg swelling.   Gastrointestinal:  Negative for abdominal distention, abdominal pain, anal bleeding, blood in stool, constipation, diarrhea, nausea, rectal pain and vomiting.   Endocrine: Negative for cold intolerance, heat intolerance, polydipsia, polyphagia and polyuria.   Genitourinary:  Negative for difficulty urinating, dysuria and flank pain.   Musculoskeletal:  Negative for arthralgias, back pain, joint swelling, myalgias and neck pain.   Skin:  Negative for rash.   Neurological:  Negative for dizziness, tremors, seizures, syncope, speech difficulty, weakness, light-headedness, numbness and headaches.   Psychiatric/Behavioral:  Negative for behavioral problems, confusion, decreased concentration, dysphoric mood, sleep disturbance and suicidal ideas. The patient is not nervous/anxious and is not hyperactive.        There were no vitals taken for this visit.  Physical Exam  Vitals and nursing note reviewed.   Constitutional:       General: He is not in acute distress.     Appearance: Normal appearance. He is well-developed. He is not ill-appearing or toxic-appearing.   HENT:      Head: Normocephalic and atraumatic.      Right Ear: Tympanic membrane, ear canal and external ear normal.      Left Ear: Tympanic membrane, ear canal and external ear normal.      Nose: Nose normal.      Mouth/Throat:      Lips: Pink.      Mouth: Mucous membranes are moist.      Pharynx: No oropharyngeal exudate or posterior oropharyngeal erythema.   Eyes:      General: No scleral icterus.        Right eye: No discharge.         Left eye: No discharge.      Extraocular Movements: Extraocular movements intact.       Conjunctiva/sclera: Conjunctivae normal.   Cardiovascular:      Rate and Rhythm: Normal rate and regular rhythm.      Pulses: Normal pulses.      Heart sounds: Normal heart sounds. No murmur heard.  Pulmonary:      Effort: Pulmonary effort is normal. No respiratory distress.      Breath sounds: Normal breath sounds. No wheezing or rales.   Abdominal:      General: Bowel sounds are normal. There is no distension.      Palpations: Abdomen is soft. There is no mass.      Tenderness: There is no abdominal tenderness. There is no right CVA tenderness, left CVA tenderness, guarding or rebound.      Hernia: No hernia is present.   Musculoskeletal:      Cervical back: Normal range of motion and neck supple. No rigidity or tenderness.   Lymphadenopathy:      Cervical: No cervical adenopathy.   Skin:     General: Skin is warm and dry.   Neurological:      General: No focal deficit present.      Mental Status: He is alert. Mental status is at baseline.   Psychiatric:         Mood and Affect: Mood normal.         Behavior: Behavior normal. Behavior is cooperative.         ASSESSMENT/PLAN    There are no diagnoses linked to this encounter.        Most recent some lab results reviewed with patient.  Any new prescription medications gone over in detail including reason for taking the medication, most common possible side effects and possible costs, etcetera.    Chronic conditions updated. Other than changes or additions as above, cont current medications and maintain follow-up with specialists if indicated.     Dyllan Lopez MD  A dictation device was used to produce this document. Use of such devices sometimes results in grammatical errors or replacement of words that sound similarly.

## 2024-09-13 ENCOUNTER — HOSPITAL ENCOUNTER (OUTPATIENT)
Dept: RADIOLOGY | Facility: HOSPITAL | Age: 44
Discharge: HOME | End: 2024-09-13
Attending: FAMILY MEDICINE
Payer: COMMERCIAL

## 2024-09-13 DIAGNOSIS — N50.82 SCROTAL PAIN: ICD-10-CM

## 2024-09-13 PROCEDURE — 76870 US EXAM SCROTUM: CPT | Mod: 26,,, | Performed by: STUDENT IN AN ORGANIZED HEALTH CARE EDUCATION/TRAINING PROGRAM

## 2024-09-13 PROCEDURE — 76870 US EXAM SCROTUM: CPT | Mod: TC

## 2024-09-16 DIAGNOSIS — R93.89 ABNORMAL ULTRASOUND: Primary | ICD-10-CM

## 2024-09-16 DIAGNOSIS — Z30.09 ENCOUNTER FOR VASECTOMY COUNSELING: ICD-10-CM

## 2024-09-17 ENCOUNTER — TELEPHONE (OUTPATIENT)
Dept: UROLOGY | Facility: CLINIC | Age: 44
End: 2024-09-17
Payer: COMMERCIAL

## 2024-09-17 NOTE — TELEPHONE ENCOUNTER
"----- Message from Ree Bob sent at 9/17/2024 10:24 AM CDT -----  Regarding: pt advice  Contact: [phone]  .Name Of Caller: Self     Contact Preference?: [phone]     What is the nature of the call?: Returning call to [facility]. Pls call      Additional Notes:  "Thank you for all that you do for our patients"  "

## 2024-09-17 NOTE — TELEPHONE ENCOUNTER
Call was placed back to patient he was given an new date with [facility] and advised to follow up with provider after that Patient agreed and understood.

## 2024-09-23 ENCOUNTER — OFFICE VISIT (OUTPATIENT)
Dept: UROLOGY | Facility: CLINIC | Age: 44
End: 2024-09-23
Payer: COMMERCIAL

## 2024-09-23 VITALS
DIASTOLIC BLOOD PRESSURE: 93 MMHG | SYSTOLIC BLOOD PRESSURE: 156 MMHG | HEIGHT: 67 IN | HEART RATE: 75 BPM | BODY MASS INDEX: 28.27 KG/M2 | WEIGHT: 180.13 LBS

## 2024-09-23 DIAGNOSIS — Z30.09 ENCOUNTER FOR VASECTOMY COUNSELING: ICD-10-CM

## 2024-09-23 DIAGNOSIS — N50.819 PAIN IN TESTICLE, UNSPECIFIED LATERALITY: Primary | ICD-10-CM

## 2024-09-23 DIAGNOSIS — Z80.42 FAMILY HISTORY OF PROSTATE CANCER: ICD-10-CM

## 2024-09-23 DIAGNOSIS — R93.89 ABNORMAL ULTRASOUND: ICD-10-CM

## 2024-09-23 LAB
BILIRUB SERPL-MCNC: NORMAL MG/DL
BLOOD URINE, POC: NORMAL
CLARITY, POC UA: CLEAR
COLOR, POC UA: YELLOW
GLUCOSE UR QL STRIP: NORMAL
KETONES UR QL STRIP: NORMAL
LEUKOCYTE ESTERASE URINE, POC: NORMAL
NITRITE, POC UA: NORMAL
PH, POC UA: 6.5
POC RESIDUAL URINE VOLUME: 19 ML (ref 0–100)
PROTEIN, POC: NORMAL
SPECIFIC GRAVITY, POC UA: 1.02
UROBILINOGEN, POC UA: 0.2

## 2024-09-23 PROCEDURE — 3044F HG A1C LEVEL LT 7.0%: CPT | Mod: CPTII,S$GLB,,

## 2024-09-23 PROCEDURE — 3077F SYST BP >= 140 MM HG: CPT | Mod: CPTII,S$GLB,,

## 2024-09-23 PROCEDURE — 1160F RVW MEDS BY RX/DR IN RCRD: CPT | Mod: CPTII,S$GLB,,

## 2024-09-23 PROCEDURE — 99214 OFFICE O/P EST MOD 30 MIN: CPT | Mod: S$GLB,,,

## 2024-09-23 PROCEDURE — 3080F DIAST BP >= 90 MM HG: CPT | Mod: CPTII,S$GLB,,

## 2024-09-23 PROCEDURE — 81002 URINALYSIS NONAUTO W/O SCOPE: CPT | Mod: S$GLB,,,

## 2024-09-23 PROCEDURE — 1159F MED LIST DOCD IN RCRD: CPT | Mod: CPTII,S$GLB,,

## 2024-09-23 PROCEDURE — 99999 PR PBB SHADOW E&M-EST. PATIENT-LVL IV: CPT | Mod: PBBFAC,,,

## 2024-09-23 PROCEDURE — 3008F BODY MASS INDEX DOCD: CPT | Mod: CPTII,S$GLB,,

## 2024-09-23 PROCEDURE — 51798 US URINE CAPACITY MEASURE: CPT | Mod: S$GLB,,,

## 2024-09-23 NOTE — PROGRESS NOTES
CHIEF COMPLAINT:  R testicle pain      HISTORY OF PRESENTING ILLINESS:  Jamel Topete is a 43 y.o. male new to [facility]. Presents to clinic today as a referral from Dr. Shirley for right testicle pain. Pain has been present x 3-5 years. Rates pain 2/10 at its worst. Increase physical activity exacerbates pain. No issues with urination, erections or ejaculation. He is interested in a vasectomy, he has a 13 and 16 year old with his wife. Currently they do not use a method of birth control. No family hx of bladder or kidney cancer. His father was dx and tx for prostate cancer last year, dx at 68 yo, currently 69 yo - doing well. PSA 9/3/2024 0.62 ng/ml.         REVIEW OF SYSTEMS:  Review of Systems   Constitutional:  Negative for chills and fever.   HENT:  Negative for congestion and sore throat.    Respiratory:  Negative for cough and shortness of breath.    Cardiovascular:  Negative for chest pain and palpitations.   Gastrointestinal:  Negative for nausea and vomiting.   Genitourinary:  Negative for dysuria, flank pain, frequency, hematuria and urgency.        Right scrotal lump   Neurological:  Negative for dizziness and headaches.         PATIENT HISTORY:    History reviewed. No pertinent past medical history.    History reviewed. No pertinent surgical history.    No family history on file.    Social History     Socioeconomic History    Marital status:    Tobacco Use    Smoking status: Never    Smokeless tobacco: Never   Substance and Sexual Activity    Alcohol use: No     Social Determinants of Health     Financial Resource Strain: Low Risk  (8/29/2024)    Overall Financial Resource Strain (CARDIA)     Difficulty of Paying Living Expenses: Not very hard   Food Insecurity: No Food Insecurity (8/29/2024)    Hunger Vital Sign     Worried About Running Out of Food in the Last Year: Never true     Ran Out of Food in the Last Year: Never true   Physical Activity: Insufficiently Active (8/29/2024)     Exercise Vital Sign     Days of Exercise per Week: 3 days     Minutes of Exercise per Session: 30 min   Stress: Stress Concern Present (8/29/2024)    [employer] - Occupational Stress Questionnaire     Feeling of Stress : Rather much   Housing Stability: Unknown (8/29/2024)    Housing Stability Vital Sign     Unable to Pay for Housing in the Last Year: No       Allergies:  Asa [aspirin] and Advil [ibuprofen]    Medications:    Current Outpatient Medications:     clobetasol 0.05% (TEMOVATE) 0.05 % Oint, Apply 1 Application topically as needed., Disp: , Rfl:     finasteride (PROPECIA) 1 mg tablet, Take 1 tablet by mouth once daily., Disp: , Rfl:     FLUoxetine 20 MG capsule, Take 1 capsule (20 mg total) by mouth once daily., Disp: 30 capsule, Rfl: 1    STELARA 45 mg/0.5 mL Syrg syringe, , Disp: , Rfl:     PHYSICAL EXAMINATION:  Physical Exam  HENT:      Head: Normocephalic and atraumatic.      Right Ear: External ear normal.      Left Ear: External ear normal.      Nose: Nose normal.      Mouth/Throat:      Mouth: Mucous membranes are moist.   Pulmonary:      Effort: Pulmonary effort is normal. No respiratory distress.   Genitourinary:     Comments: Moderate sized R scrotal cyst palpated   Skin:     General: Skin is warm and dry.   Neurological:      General: No focal deficit present.      Mental Status: He is alert and oriented to person, place, and time.   Psychiatric:         Mood and Affect: Mood normal.         Behavior: Behavior normal.           LABS:  UA WNL  PVR 19 ml       Lab Results   Component Value Date    PSA 0.62 09/03/2024       Lab Results   Component Value Date    CREATININE 0.9 09/03/2024    EGFRNORACEVR >60.0 09/03/2024               IMPRESSION:    Encounter Diagnoses   Name Primary?    Pain in testicle, unspecified laterality Yes    Abnormal ultrasound     Encounter for vasectomy counseling     Family history of prostate cancer          Assessment:       1. Pain in  testicle, unspecified laterality    2. Abnormal ultrasound    3. Encounter for vasectomy counseling    4. Family history of prostate cancer        Plan:   - Testicle pain precautions discussed.   - Referral placed to Dr. Roque for vasectomy consult.  - Continue annual PSA screening.     RTC for apt with Dr. Roque     I spent 45 minutes with the patient of which more than half was spent in direct consultation with the patient in regards to our treatment and plan.  We addressed the chief complaint as well as other office findings during the visit. Reviewed the possible contributory factors.

## 2024-09-23 NOTE — PATIENT INSTRUCTIONS
Spermatoceles are non cancerous and generally painless, a spermatocele usually is filled with milky or clear fluid that might contain sperm.     Testicle Pain:  Good scrotal support - wear jock strap over underwear intermittently as needed.   Use ice for pain as needed - barrier between skin and ice pack, 15 min on, 45 min off intermittently as needed.  NSAIDS for pain as needed. Risk of gastric ulcers with NSAIDs instructed patient to take with food.   OTC Voltaren gel for pain relief.   Use rolled dishtowel to elevate scrotum x1 hour at night to help decrease pain and swelling.  Quercetin/tumeric, as needed, indefinitely, for testicle pain/discomfort.  No heavy lifting over 10 lbs for 2 weeks.

## 2024-09-26 ENCOUNTER — OFFICE VISIT (OUTPATIENT)
Dept: UROLOGY | Facility: CLINIC | Age: 44
End: 2024-09-26
Payer: COMMERCIAL

## 2024-09-26 VITALS
SYSTOLIC BLOOD PRESSURE: 149 MMHG | DIASTOLIC BLOOD PRESSURE: 92 MMHG | HEIGHT: 67 IN | HEART RATE: 105 BPM | WEIGHT: 178 LBS | BODY MASS INDEX: 27.94 KG/M2

## 2024-09-26 DIAGNOSIS — Z30.2 ENCOUNTER FOR MALE STERILIZATION PROCEDURE: ICD-10-CM

## 2024-09-26 PROBLEM — N50.82 SCROTAL PAIN: Status: RESOLVED | Noted: 2024-09-04 | Resolved: 2024-09-26

## 2024-09-26 PROCEDURE — 99999 PR PBB SHADOW E&M-EST. PATIENT-LVL IV: CPT | Mod: PBBFAC,,, | Performed by: UROLOGY

## 2024-09-26 RX ORDER — LIDOCAINE HYDROCHLORIDE 10 MG/ML
20 INJECTION, SOLUTION INFILTRATION; PERINEURAL ONCE
Status: SHIPPED | OUTPATIENT
Start: 2024-10-26

## 2024-09-26 NOTE — PATIENT INSTRUCTIONS
Having a Vasectomy: Before, During, and After the Procedure  Vasectomy is an outpatient (same day) procedure. It can be done in a doctors office, clinic, or hospital. Your doctor will talk with you about preparing for surgery. He or she will also discuss the possible risks and complications with you. After the procedure, follow all your doctors advice for recovery.  Preparing for Surgery      The cut ends of the vas may be tied, closed with a clip, or sealed by heat (cauterized).    Your doctor will talk with you about getting ready for surgery. You may be asked to do the following:  Sign a consent form. This must be done at least a few days before surgery. It gives your doctor permission to do the procedure. It also states that a vasectomy is not guaranteed to make you sterile.  Dont take aspirin, ibuprofen, or naproxen for 1 week before surgery or 1 week after. These medications can cause bleeding after the procedure. Also, tell your doctor if you take any medications, supplements, or herbal remedies.  Tell your doctor if youve had any prior scrotal surgery.  Arrange for an adult family member or friend to give you a ride home after surgery.  Shower and clean your scrotum the day of surgery. Your doctor may also ask you to shave your scrotum.  Bring an athletic supporter (jockstrap) and a pair of loose fitting pants to the doctors office or hospital.  Eat something with sugar before surgery.  During Surgery  The entire procedure usually lasts less than 30 minutes.  Youll be asked to undress and lie on a table.  To prevent pain during surgery, youll be given an injection of local anesthetic in your scrotum or lower groin.  Once the area is numb, one or two small incisions are made in the scrotum.   The vas deferens are lifted through the incision and cut. The ends of the vas are then sealed off using one of several methods.  If needed, the incision is closed with stitches.  You can rest for a while until  youre ready to go home.  Recovering at Home  For about a week, your scrotum may look bruised and slightly swollen. You may also have a small amount of bloody discharge from the incision. This is normal.  To help make your recovery more comfortable, follow the tips below.  Stay off your feet as much as possible for the first 2 days.   Wear an athletic supporter or snug cotton briefs for support.  Ice the area for 24 hours  Take medications with acetaminophen (such as Tylenol) to relieve any discomfort. Dont use aspirin, ibuprofen, or naproxen.  Avoid heavy lifting, exercise, or intercourse for 7 days.  Remember: You must use another form of birth control until youre completely sterile.  Call your doctor if you notice any of the following after surgery:   Increasing pain or swelling in your scrotum  A large black-and-blue area, or a growing lump  Fever or chills  Increasing redness or drainage of the incision  Trouble urinating    Sex After Vasectomy  Vasectomy doesnt change your sexual function. So when you start having sex again, it should feel the same as before. A vasectomy also shouldnt affect your relationship with your partner. Its important to remember, though, that you wont become sterile right away. It will take time before you can have sex without the need for birth control.  Until youre sterile: After a vasectomy, some active sperm still remain in your semen. It will take time and many ejaculations before the sperm are completely gone. During this period, you must use another birth control method to prevent pregnancy. To make sure no sperm are left in your semen, youll need to have one or more semen exams. You usually collect a semen sample at home and bring it to a lab. The sample is then checked under a microscope. Youre sterile only when these samples show no evidence of sperm. Ask your doctor whether additional follow-up is needed.  After youre sterile: After your doctor tells you youre  sterile, you no longer need to use any form of birth control. Youre free to have sex without the fear of unwanted pregnancy. However, a vasectomy does not protect you from sexually transmitted diseases (STDs). If you have more than one sex partner, be sure to practice safer sex by using condoms.  © 9888-0595 [employer], [address] [place] [address], [place] [address]. All rights reserved. This information is not intended as a substitute for professional medical care. Always follow your healthcare professional's instructions.    Vasectomy: Risks and Complications  A vasectomy is an outpatient procedure. This means youll go home the same day. Its done in a doctors office, clinic, or hospital. Before your procedure, youll be asked to read and sign a consent form. This form states youre aware of the possible risks and complications. It also says that you understand that the procedure, though most often successful, cant promise to make you sterile. Be sure you have all your questions answered before you sign this form. Below is a list of risks and possible complications of the procedure.  Risks and Possible Complications of Vasectomy   Vasectomy is safe. But it does have risks. They include the following:  Bleeding or infection.  Sperm granuloma. This is a small, harmless lump. It may form where the vas deferens is sealed off.  Loss of Testicle  Epididymitis.This is inflammation that may cause scrotal aching. It often goes away without treatment. Anti-inflammatory medications can provide relief.  Reconnection of the vas deferens. This can occur in rare cases. It makes you fertile again. This can result in an unwanted pregnancy.  Sperm antibodies.These are a common response of the body to absorbed sperm. The antibodies can make you sterile. This is true even if you later try to reverse your vasectomy.  Long-term testicular discomfort.This may occur after surgery. But its very rare.    © 0083-8540 [employer],  [address] [place] [address]. All rights reserved. This information is not intended as a substitute for professional medical care. Always follow your healthcare professional's instructions.

## 2024-09-26 NOTE — PROGRESS NOTES
Chief Complaint:   Undesired fertility    HPI:  Mr. Topete is a 43 y.o.  male who presents for evaluation re vasectomy. He has 2 children, ages 16 and 14 yo, and he is certain that he desires no more. He is aware of other forms of contraception.  He's currently using nothing for contraception. He is aware that vasectomy is to be considered permanent/irreversible.    He denies orchalgia.  No dysuria.  No hematuria.    ROS:  Jamel Topete denies headache, blurred vision, fever, nausea, vomiting, chills, abdominal pain, chest pain, sore throat, bleeding per rectum, cough, SOB, recent loss of consciousness, recent mental status changes, seizures, dizziness, or upper or lower extremity weakness.    Past Medical History    Past Medical History:   Diagnosis Date    Anxiety disorder, unspecified     Nonscarring hair loss, unspecified        Past Surgical History    History reviewed. No pertinent surgical history.    Social History    Social History     Socioeconomic History    Marital status:    Tobacco Use    Smoking status: Never    Smokeless tobacco: Never   Substance and Sexual Activity    Alcohol use: No    Drug use: Never    Sexual activity: Yes     Partners: Female     Social Determinants of Health     Financial Resource Strain: Low Risk  (8/29/2024)    Overall Financial Resource Strain (CARDIA)     Difficulty of Paying Living Expenses: Not very hard   Food Insecurity: No Food Insecurity (8/29/2024)    Hunger Vital Sign     Worried About Running Out of Food in the Last Year: Never true     Ran Out of Food in the Last Year: Never true   Physical Activity: Insufficiently Active (8/29/2024)    Exercise Vital Sign     Days of Exercise per Week: 3 days     Minutes of Exercise per Session: 30 min   Stress: Stress Concern Present (8/29/2024)    [employer] - Occupational Stress Questionnaire     Feeling of Stress : Rather much   Housing Stability: Unknown (8/29/2024)    Housing Stability  Vital Sign     Unable to Pay for Housing in the Last Year: No       Family History  Family History   Problem Relation Name Age of Onset    Prostate cancer Father      No Known Problems Mother      Bone cancer Sister           Medications    Current Outpatient Medications:     clobetasol 0.05% (TEMOVATE) 0.05 % Oint, Apply 1 Application topically as needed., Disp: , Rfl:     finasteride (PROPECIA) 1 mg tablet, Take 1 tablet by mouth once daily., Disp: , Rfl:     FLUoxetine 20 MG capsule, Take 1 capsule (20 mg total) by mouth once daily., Disp: 30 capsule, Rfl: 1    STELARA 45 mg/0.5 mL Syrg syringe, , Disp: , Rfl:     Allergies  Review of patient's allergies indicates:  No Known Allergies      ?  PHYSICAL EXAM:    The patient generally appears in good health, is appropriately interactive, and is in no apparent distress.     Eyes: anicteric sclerae, moist conjunctivae; no lid-lag; PERRLA     HENT: Atraumatic; oropharynx clear with moist mucous membranes and no mucosal ulcerations;normal hard and soft palate.  No evidence of lymphadenopathy.    Neck: Trachea midline.  No thyromegaly.    Skin: No lesions.    Mental: Cooperative with normal affect.  Is oriented to time, place, and person.    Neuro: Grossly intact.    Chest: Normal inspiratory effort.   No accessory muscles.  No audible wheezes.  Respirations symmetric on inspiration and expiration.    Heart: Regular rhythm.      Abdomen:  Soft, non-tender. No masses or organomegaly. Bladder is not palpable. No evidence of flank discomfort. No evidence of inguinal hernia.    Genitourinary: The penis has no evidence of plaques or induration. The urethral meatus is normal. The testes, epididymides, and cord structures are normal in size and contour bilaterally. The scrotum is normal in size and contour.    Extremities: No clubbing, cyanosis, or edema          A/P:  Jamel Topete is a 43 y.o. male who presents for evaluation re vasectomy.    1.I discussed with the patient  risks and benefits, as well as alternatives. We discussed possible complications at length, including fever, infection, bleeding--possibly requiring reoperation,testicular injury or atrophy with loss of function, chronic pain, persistent or recurrent presence of sperm in the ejaculate, vasal recanalization, as well as the risks attendant to the anesthetic.  2.We discussed that he should stop aspirin, ibuprofen, and similar products at least one week prior to the procedure. Acetominophen is okay to use for headaches, pain, etc.  3. He should bring an athletic supporter and loose fitting sweat pants on the day of the procedure.  4. We discussed that he must continue to use contraception until two consecutive semen analyses (checked at approximately 4-6 weeks post-vasectomy) reveal azoospermia.  5. He will schedule an elective vasectomy.

## 2024-10-10 ENCOUNTER — PATIENT MESSAGE (OUTPATIENT)
Dept: UROLOGY | Facility: CLINIC | Age: 44
End: 2024-10-10
Payer: COMMERCIAL

## 2024-10-10 ENCOUNTER — TELEPHONE (OUTPATIENT)
Dept: UROLOGY | Facility: CLINIC | Age: 44
End: 2024-10-10
Payer: COMMERCIAL

## 2024-10-10 NOTE — TELEPHONE ENCOUNTER
Refill Routing Note   Medication(s) are not appropriate for processing by [facility] for the following reason(s):        New or recently adjusted medication    [facility] action(s):  Defer               Appointments  past 12m or future 3m with PCP    Date Provider   Last Visit   9/3/2024 Dyllan Lopez MD   Next Visit   Visit date not found Dyllan Lopez MD   ED visits in past 90 days: 0        Note composed:2:55 PM 10/10/2024

## 2024-10-10 NOTE — TELEPHONE ENCOUNTER
No care due was identified.  [facility] Embedded Care Due Messages. Reference number: [id number].   10/10/2024 8:09:11 AM CDT

## 2024-10-11 ENCOUNTER — PROCEDURE VISIT (OUTPATIENT)
Dept: UROLOGY | Facility: CLINIC | Age: 44
End: 2024-10-11
Payer: COMMERCIAL

## 2024-10-11 VITALS
HEART RATE: 86 BPM | TEMPERATURE: 98 F | DIASTOLIC BLOOD PRESSURE: 79 MMHG | BODY MASS INDEX: 28.66 KG/M2 | SYSTOLIC BLOOD PRESSURE: 140 MMHG | WEIGHT: 183 LBS | RESPIRATION RATE: 18 BRPM

## 2024-10-11 DIAGNOSIS — Z30.2 ENCOUNTER FOR MALE STERILIZATION PROCEDURE: ICD-10-CM

## 2024-10-11 RX ORDER — FLUOXETINE HYDROCHLORIDE 20 MG/1
CAPSULE ORAL
Qty: 30 CAPSULE | Refills: 0 | Status: SHIPPED | OUTPATIENT
Start: 2024-10-11

## 2024-10-11 RX ORDER — OXYCODONE AND ACETAMINOPHEN 5; 325 MG/1; MG/1
1 TABLET ORAL EVERY 4 HOURS PRN
Qty: 8 TABLET | Refills: 0 | Status: SHIPPED | OUTPATIENT
Start: 2024-10-11 | End: 2024-10-21

## 2024-10-11 RX ORDER — CEPHALEXIN 500 MG/1
500 CAPSULE ORAL 4 TIMES DAILY
Qty: 8 CAPSULE | Refills: 0 | Status: SHIPPED | OUTPATIENT
Start: 2024-10-11 | End: 2024-10-13

## 2024-10-11 RX ADMIN — LIDOCAINE HYDROCHLORIDE 20 ML: 10 INJECTION, SOLUTION INFILTRATION; PERINEURAL at 09:10

## 2024-10-11 NOTE — PATIENT INSTRUCTIONS
What to Expect After a Vasectomy  You cannot drive or operate heavy machinery on the day of the procedure.    Apply ice packs to the scrotal area for 24-48 hours. Avoid direct contact of the ice pack with the skin. Scrotal supports, jock straps, or fitted underwear help elevate the scrotum and reduce discomfort.    You may shower the next day. Gently apply soapy water to the scrotum to wash. Rinse and dry yourself by blotting the skin, not rubbing.    Avoid strenuous physical exercises or sexual relations for at least one week after a vasectomy.    Continue to use birth control for at least 6 weeks or 10-20 ejaculations. You are still considered fertile until your urologist examines a post-vasectomy semen analysis at 6 weeks and perhaps one at 8 weeks as well. Drop off the specimen at the Urology Department, 2nd floor, [facility] between 8am and 4pm.    Do NOT resume unprotected sexual activity until your physician finds no sperm in your semen.    All stitches will dissolve on their own in 1-2 weeks.    Signs and Symptoms to Report  A large amount of bleeding at the site  An unusual amount of pain  A large amount of swelling in the scrotum  Fever and chills  Any signs of infection, such as redness at the site or foul-smelling drainage    Risks  The risks of complication after vasectomy are very low. A few of the risks include:  Bleeding  Infection  Scrotal hematoma - a collection of blood in the scrotum  Inflammation of the epididymis - inflammation of a structure next to the testicle that helps in maturation of sperm  Sperm granuloma - a collection of sperm that leaks out from the vas deferens, forming a small nodule or lump. This does not usually cause any discomfort but you may feel it in the scrotum.  Recanalization - the restoration of the lumen or transport tube between the two ends of the vas deferens, possibly causing fertility    If you have any questions or concerns, please call your [facility]  urologist at [phone].

## 2024-10-11 NOTE — TELEPHONE ENCOUNTER
Spoke to the patient. The patient states he no longer needs the fluoxetine. He is currently not taking it.

## 2024-10-11 NOTE — PROCEDURES
Date: 10/11/2024     Pre procedure diagnosis: male sterilization    Post procedure diagnosis:same    Surgeon: Mya    Assistant: Lauren    Procedure performed: Bilateral vasectomy    Blood loss: Min.    Specimen: None    Procedure in detail:  After ainformed consent was obtained, the patient was placed in the supine position.  The skin was sterilized with a prep.  A time out was performed.  Attention was then turned to the patient's left hemiscrotum.    The vas was isolated on this side.  Local anesthesia was applied with 1% lidocaine.  The skin overlying the vas was incised sharply.  The vas was then isolated and grasped with a ring forceps.  It was brought through the incision.  The adventia overlying the vas was incised sharply.  The vas was then doubly clamped with a hemostat.  The vas was divided between the two hemostats with a 15 blade scalpel.    The ends of the vas were cauterized and tied with 2-0 silk sutures.  Two sutures were placed on each side. Electrocautery was used to obtain hemostasis.  A fascial interposition was then done with a 3-0 chromic.    The wound was then closed with a 3-0 chromic.    Attention was then turned to the right hemiscrotum and the exact same procedure was done. The vas was isolated on this side.  Local anesthesia was applied with 1% lidocaine.  The skin overlying the vas was incised sharply.  The vas was then isolated and grasped with a ring forceps.  It was brought through the incision.  The adventia overlying the vas was incised sharply.  The vas was then doubly clamped with a hemostat.  The vas was divided between the two hemostats with a 15 blade scalpel.    The ends of the vas were cauterized and tied with 2-0 silk sutures.  Two sutures were placed on each side. Electrocautery was used to obtain hemostasis.  A fascial interposition was then done with a 3-0 chromic.    The wound was then closed with a 3-0 chromic.    He tolerated the procedure well without  complication.  He was advised to continue contraception until he brings in 2 semen samples that show no sperm.  He is also to avoid lifting, strenuous exercise, intercourse, NSAIDS, and ASA for 1 week.  He will be discharged home is stable condition.  He is to follow up prn.

## 2024-11-15 ENCOUNTER — LAB VISIT (OUTPATIENT)
Dept: LAB | Facility: HOSPITAL | Age: 44
End: 2024-11-15
Attending: NURSE PRACTITIONER
Payer: COMMERCIAL

## 2024-11-15 DIAGNOSIS — Z98.52 S/P VASECTOMY: ICD-10-CM

## 2024-11-15 DIAGNOSIS — Z98.52 S/P VASECTOMY: Primary | ICD-10-CM

## 2024-11-15 LAB
SPECIMEN VOL SMN: 1.1 ML
SPERM P VAS # SMN: ABNORMAL M/ML

## 2024-11-15 PROCEDURE — 89320 SEMEN ANAL VOL/COUNT/MOT: CPT | Performed by: NURSE PRACTITIONER

## 2024-11-18 ENCOUNTER — PATIENT MESSAGE (OUTPATIENT)
Dept: UROLOGY | Facility: CLINIC | Age: 44
End: 2024-11-18
Payer: COMMERCIAL

## 2024-11-26 ENCOUNTER — LAB VISIT (OUTPATIENT)
Dept: LAB | Facility: HOSPITAL | Age: 44
End: 2024-11-26
Attending: NURSE PRACTITIONER
Payer: COMMERCIAL

## 2024-11-26 DIAGNOSIS — Z98.52 S/P VASECTOMY: Primary | ICD-10-CM

## 2024-11-26 DIAGNOSIS — Z98.52 S/P VASECTOMY: ICD-10-CM

## 2024-11-26 LAB
SPECIMEN VOL SMN: 1.8 ML
SPERM P VAS # SMN: NORMAL M/ML

## 2024-11-26 PROCEDURE — 89320 SEMEN ANAL VOL/COUNT/MOT: CPT | Performed by: NURSE PRACTITIONER

## 2024-12-02 ENCOUNTER — PATIENT MESSAGE (OUTPATIENT)
Dept: UROLOGY | Facility: CLINIC | Age: 44
End: 2024-12-02
Payer: COMMERCIAL

## 2025-01-03 ENCOUNTER — OFFICE VISIT (OUTPATIENT)
Dept: DERMATOLOGY | Facility: CLINIC | Age: 45
End: 2025-01-03
Payer: COMMERCIAL

## 2025-01-03 DIAGNOSIS — L40.50 PSORIATIC ARTHRITIS: ICD-10-CM

## 2025-01-03 DIAGNOSIS — Z12.83 SKIN CANCER SCREENING: ICD-10-CM

## 2025-01-03 DIAGNOSIS — Z80.8 FAMILY HISTORY OF MELANOMA: Primary | ICD-10-CM

## 2025-01-03 DIAGNOSIS — L81.4 LENTIGO: ICD-10-CM

## 2025-01-03 DIAGNOSIS — D22.9 MULTIPLE BENIGN NEVI: ICD-10-CM

## 2025-01-03 DIAGNOSIS — L40.0 PLAQUE PSORIASIS: ICD-10-CM

## 2025-01-03 PROCEDURE — 99999 PR PBB SHADOW E&M-EST. PATIENT-LVL III: CPT | Mod: PBBFAC,,, | Performed by: STUDENT IN AN ORGANIZED HEALTH CARE EDUCATION/TRAINING PROGRAM

## 2025-01-03 NOTE — PROGRESS NOTES
Subjective:      Patient ID:  Jamel Topete is a 44 y.o. male who presents for   Chief Complaint   Patient presents with    Skin Check     TBSE     Jamel Topete is a 44 y.o. male who presents for: FBSE screening exam for skin cancer.    New patient    The patient has no specific concerns today.    Pertinent history:  History of blistering sunburns: Yes in childhood  History of tanning bed use: No  Family history of melanoma: Yes, father  Personal history of mole removal: No  Personal history of skin cancer: No          - follows with rheuma at [facility]. On stelara. Skin well controlled with one persistent spot on left parietal scalp for which he uses clobetasol    Review of Systems   Skin:  Positive for activity-related sunscreen use. Negative for daily sunscreen use, recent sunburn and wears hat.   Hematologic/Lymphatic: Does not bruise/bleed easily.       Objective:   Physical Exam   Constitutional: He appears well-developed and well-nourished. No distress.   Neurological: He is alert and oriented to person, place, and time. He is not disoriented.   Psychiatric: He has a normal mood and affect.   Skin:   Areas Examined (abnormalities noted in diagram):   Scalp / Hair Palpated and Inspected  Head / Face Inspection Performed  Neck Inspection Performed  Chest / Axilla Inspection Performed  Abdomen Inspection Performed  Back Inspection Performed  RUE Inspected  LUE Inspection Performed  RLE Inspected  LLE Inspection Performed  Nails and Digits Inspection Performed                         Diagram Legend     Erythematous scaling macule/papule c/w actinic keratosis       Vascular papule c/w angioma      Pigmented verrucoid papule/plaque c/w seborrheic keratosis      Yellow umbilicated papule c/w sebaceous hyperplasia      Irregularly shaped tan macule c/w lentigo     1-2 mm smooth white papules consistent with Milia      Movable subcutaneous cyst with punctum c/w epidermal inclusion cyst      Subcutaneous movable cyst  c/w pilar cyst      Firm pink to brown papule c/w dermatofibroma      Pedunculated fleshy papule(s) c/w skin tag(s)      Evenly pigmented macule c/w junctional nevus     Mildly variegated pigmented, slightly irregular-bordered macule c/w mildly atypical nevus      Flesh colored to evenly pigmented papule c/w intradermal nevus       Pink pearly papule/plaque c/w basal cell carcinoma      Erythematous hyperkeratotic cursted plaque c/w SCC      Surgical scar with no sign of skin cancer recurrence      Open and closed comedones      Inflammatory papules and pustules      Verrucoid papule consistent consistent with wart     Erythematous eczematous patches and plaques     Dystrophic onycholytic nail with subungual debris c/w onychomycosis     Umbilicated papule    Erythematous-base heme-crusted tan verrucoid plaque consistent with inflamed seborrheic keratosis     Erythematous Silvery Scaling Plaque c/w Psoriasis     See annotation      Assessment / Plan:        Family history of melanoma  Skin cancer screening  Total body skin examination performed today including at least 12 points as noted in physical examination. No lesions suspicious for malignancy noted.    Recommend daily sun protection/avoidance, use of at least SPF 30, broad spectrum sunscreen (OTC drug), skin self examinations, and routine physician surveillance to optimize early detection    Lentigo  Multiple benign nevi  Reassurance given to patient. No treatment is necessary.   Treatment of benign, asymptomatic lesions may be considered cosmetic.    Plaque psoriasis  Psoriatic arthritis  - follows with rheuma at [facility]. On stelara. Skin well controlled with one persistent spot on left parietal scalp for which he uses clobetasol--continue prn    LSC--left wrist  - d/c friction  - clobetasol bid prn         Follow up in about 1 year (around 1/3/2026) for TBSE.

## 2025-01-03 NOTE — PATIENT INSTRUCTIONS
Sunscreen Guidelines  Sunscreen protects your skin by absorbing and reflecting ultraviolet rays. All sunscreens have a sun protection factor (SPF) rating that indicates how long a sunscreen will remain effective on the skin.    Why protect your skin?  The sun's rays are composed of many different wavelengths, including UVA, UVB, and visible light that each affect the skin differently.    UVB: sunburn, photoaging, skin cancer (melanoma, basal cell, and squamous cell carcinomas) and modulation of the skin's immune system.    UVA: similar to above but thought to contribute more to aging; at the same dose of UVB it is less powerful however the sun has 10-20 times the levels of UVA as compared with UVB.  Visible light: implicated in causing unwanted darkening of skin, such as melasma and post-inflammatory hyperpigmentation in darker skin types     If I have dark skin, do I need to worry about the sun?    More darkly pigmented skin is more protected against UV-induced skin cancer, sunburn, and photoaging, though may still suffer from sun-related conditions, including melasma, hyperpigmentation, and other dark spots.    Sun avoidance  As a general rule, stay out of the sun as much as possible between 10 a.m. - 4 p.m.    Download the EPA UV index nata to track the UV index by hour in your zip code.      Which sunscreen should I choose?  The best sunscreen to use varies by individual. The one that feels best on your skin and fits your lifestyle will be the one you will likely use most regularly.   Active ingredients of sunscreen vary by , and may be a chemical (such as avobenzone or oxybenzone) or physical agent (such as zinc oxide or titanium dioxide). I recommend a physical agent.  A water-resistant sunscreen is one that maintains the SPF level after 40 minutes of water exposure. A very water-resistant sunscreen maintains the SPF level after 80 minutes of water exposure.    Sunscreen: this is the last layer in  "sun protection   Be generous: 1 shot glass of sunscreen for your body, ½ teaspoon for your face/neck  Reapply every 2 hours  Broad spectrum (provides UVA/UVB protection), SPF 50 or above  Avoid spray sunscreens: less effective and have been found to contain benzene (carcinogen)    Sun protective clothing  Although sunscreen helps minimize sun damage, no sunscreen completely blocks all wavelengths of UV light. Wearing sun protective clothing such as hats, rashguards or swim shirts, and long sleeves and/or pants, as well as avoiding sun exposure from 10 a.m. to 4 p.m. will help protect your skin from overexposure and minimize sun damage. Seek shade.  Long sleeved clothing, hats, and sunglasses: makes sun protection easier, more effective, and can even be more affordable, since sunscreen needs to be reapplied frequently.    Solumbra (www.sunprectautions.Fara)  [employer] Technologies (www.[employer]stream)  Coolibar (www.[employer].Fara)  Land's end (www.Pharminox)  Hats from [employer] ([email or web address])    My Favorite Sunscreens:  Physical blockers: Can have a "white case" but in general are more effective  - Face: CeraVe tinted mineral sunscreen, Bare Minerals complexion rescue (20 shades), Elta MD (UV elements, UV physical, UV restore, etc), Tizo ultra zinc tinted, Cetaphil Sheer Mineral Face Liquid Sunscreen  - Body: Blue Lizard, Neutrogena Sheer Zinc, Eucerin Daily Protection, Aveeno Baby   "

## 2025-05-07 ENCOUNTER — OFFICE VISIT (OUTPATIENT)
Dept: URGENT CARE | Facility: CLINIC | Age: 45
End: 2025-05-07
Payer: COMMERCIAL

## 2025-05-07 ENCOUNTER — PATIENT MESSAGE (OUTPATIENT)
Dept: URGENT CARE | Facility: CLINIC | Age: 45
End: 2025-05-07
Payer: COMMERCIAL

## 2025-05-07 VITALS
HEART RATE: 70 BPM | OXYGEN SATURATION: 96 % | SYSTOLIC BLOOD PRESSURE: 124 MMHG | TEMPERATURE: 96 F | BODY MASS INDEX: 28.25 KG/M2 | RESPIRATION RATE: 14 BRPM | DIASTOLIC BLOOD PRESSURE: 87 MMHG | WEIGHT: 180 LBS | HEIGHT: 67 IN

## 2025-05-07 DIAGNOSIS — J18.9 ATYPICAL PNEUMONIA: ICD-10-CM

## 2025-05-07 DIAGNOSIS — R05.9 COUGH, UNSPECIFIED TYPE: ICD-10-CM

## 2025-05-07 DIAGNOSIS — R07.81 PLEURITIC CHEST PAIN: Primary | ICD-10-CM

## 2025-05-07 PROCEDURE — 99214 OFFICE O/P EST MOD 30 MIN: CPT | Mod: S$GLB,,, | Performed by: FAMILY MEDICINE

## 2025-05-07 PROCEDURE — 71046 X-RAY EXAM CHEST 2 VIEWS: CPT | Mod: S$GLB,,, | Performed by: RADIOLOGY

## 2025-05-07 RX ORDER — BENZONATATE 200 MG/1
200 CAPSULE ORAL 3 TIMES DAILY PRN
Qty: 30 CAPSULE | Refills: 0 | Status: SHIPPED | OUTPATIENT
Start: 2025-05-07 | End: 2025-05-17

## 2025-05-07 RX ORDER — AZITHROMYCIN 500 MG/1
500 TABLET, FILM COATED ORAL DAILY
Qty: 3 TABLET | Refills: 0 | Status: SHIPPED | OUTPATIENT
Start: 2025-05-07

## 2025-05-07 RX ORDER — ALBUTEROL SULFATE 90 UG/1
2 INHALANT RESPIRATORY (INHALATION) EVERY 6 HOURS PRN
Qty: 18 G | Refills: 0 | Status: SHIPPED | OUTPATIENT
Start: 2025-05-07 | End: 2026-05-07

## 2025-05-07 NOTE — PROGRESS NOTES
"Subjective:      Patient ID: Jamel Topete is a 44 y.o. male.    Vitals:  height is 5' 7" (1.702 m) and weight is 81.6 kg (180 lb). His temperature is 96.3 °F (35.7 °C). His blood pressure is 124/87 and his pulse is 70. His respiration is 14 and oxygen saturation is 96%.     Chief Complaint: Cough    43 y/o male c/o with a cough that's been going on for 5 weeks. Patient states its worst at night and that his cough is dry. Patient states that it makes his chest hurt when he cough and states he took OTC meds. Patient states it started with a sour throat.     Cough  This is a new problem. The current episode started 1 to 4 weeks ago. The problem has been gradually worsening. Associated symptoms include chest pain. Pertinent negatives include no chills, ear congestion, ear pain, fever, headaches, heartburn, hemoptysis, myalgias, nasal congestion, postnasal drip or rash. Nothing aggravates the symptoms. He has tried OTC cough suppressant for the symptoms. The treatment provided no relief.       Constitution: Negative for chills and fever.   HENT:  Negative for ear pain and postnasal drip.    Cardiovascular:  Positive for chest pain.   Respiratory:  Positive for cough. Negative for bloody sputum.    Gastrointestinal:  Negative for heartburn.   Musculoskeletal:  Negative for muscle ache.   Skin:  Negative for rash.   Neurological:  Negative for headaches.    Objective:     Physical Exam   Constitutional: He is oriented to person, place, and time. He appears ill. No distress. normal  HENT:   Head: Normocephalic and atraumatic.   Ears:   Right Ear: Tympanic membrane, external ear and ear canal normal.   Left Ear: Tympanic membrane, external ear and ear canal normal.   Nose: No rhinorrhea or congestion.   Eyes: Conjunctivae are normal. Pupils are equal, round, and reactive to light. Extraocular movement intact   Neck: Neck supple. No neck rigidity present.   Cardiovascular: Normal heart sounds and normal pulses.   No murmur " heard.  Pulmonary/Chest: Effort normal. No stridor. No respiratory distress. He has rhonchi.   Abdominal: Normal appearance and bowel sounds are normal. He exhibits no distension. flat abdomen There is no abdominal tenderness. There is no rebound and no guarding.   Musculoskeletal: Normal range of motion.         General: Normal range of motion.   Neurological: no focal deficit. He is alert, oriented to person, place, and time and at baseline.   Skin: Skin is warm and dry. Capillary refill takes less than 2 seconds. No bruising   Psychiatric: His behavior is normal. Mood, judgment and thought content normal.   Nursing note and vitals reviewed.      Assessment:     Plan:   1. Pleuritic chest pain  - XR CHEST PA AND LATERAL; Future    2. Atypical pneumonia  - azithromycin (ZITHROMAX) 500 MG tablet; Take 1 tablet (500 mg total) by mouth once daily.  Dispense: 3 tablet; Refill: 0  - albuterol (VENTOLIN HFA) 90 mcg/actuation inhaler; Inhale 2 puffs into the lungs every 6 (six) hours as needed for Wheezing. Rescue  Dispense: 18 g; Refill: 0    3. Cough, unspecified type  - benzonatate (TESSALON) 200 MG capsule; Take 1 capsule (200 mg total) by mouth 3 (three) times daily as needed.  Dispense: 30 capsule; Refill: 0   All results discussed with pt.                     Admission Reconciliation is Completed  Discharge Reconciliation is Completed

## 2025-05-14 ENCOUNTER — PATIENT MESSAGE (OUTPATIENT)
Dept: PRIMARY CARE CLINIC | Facility: CLINIC | Age: 45
End: 2025-05-14
Payer: COMMERCIAL

## 2025-05-15 ENCOUNTER — OFFICE VISIT (OUTPATIENT)
Dept: PRIMARY CARE CLINIC | Facility: CLINIC | Age: 45
End: 2025-05-15
Payer: COMMERCIAL

## 2025-05-15 VITALS
SYSTOLIC BLOOD PRESSURE: 132 MMHG | HEART RATE: 108 BPM | HEIGHT: 67 IN | BODY MASS INDEX: 29.34 KG/M2 | DIASTOLIC BLOOD PRESSURE: 84 MMHG | WEIGHT: 186.94 LBS | OXYGEN SATURATION: 98 %

## 2025-05-15 DIAGNOSIS — R07.81 PLEURITIC CHEST PAIN: ICD-10-CM

## 2025-05-15 DIAGNOSIS — R05.9 COUGH IN ADULT: Primary | ICD-10-CM

## 2025-05-15 LAB
CTP QC/QA: YES
CTP QC/QA: YES
FLUAV AG NPH QL: NEGATIVE
FLUBV AG NPH QL: NEGATIVE
SARS-COV-2 RDRP RESP QL NAA+PROBE: NEGATIVE

## 2025-05-15 PROCEDURE — 99999 PR PBB SHADOW E&M-EST. PATIENT-LVL IV: CPT | Mod: PBBFAC,,, | Performed by: NURSE PRACTITIONER

## 2025-05-15 RX ORDER — ALBUTEROL SULFATE 0.83 MG/ML
2.5 SOLUTION RESPIRATORY (INHALATION)
Status: COMPLETED | OUTPATIENT
Start: 2025-05-15 | End: 2025-05-15

## 2025-05-15 RX ORDER — CODEINE PHOSPHATE AND GUAIFENESIN 10; 100 MG/5ML; MG/5ML
5 SOLUTION ORAL EVERY 8 HOURS PRN
Qty: 118 ML | Refills: 0 | Status: SHIPPED | OUTPATIENT
Start: 2025-05-15 | End: 2025-05-25

## 2025-05-15 RX ORDER — CIPROFLOXACIN 750 MG/1
750 TABLET, FILM COATED ORAL EVERY 12 HOURS
Qty: 20 TABLET | Refills: 0 | Status: SHIPPED | OUTPATIENT
Start: 2025-05-15 | End: 2025-05-25

## 2025-05-15 RX ADMIN — ALBUTEROL SULFATE 2.5 MG: 0.83 SOLUTION RESPIRATORY (INHALATION) at 02:05

## 2025-05-15 NOTE — PROGRESS NOTES
[facility] Note    Chief Complaint      Chief Complaint   Patient presents with    Cough    Chest Congestion       History of Present Illness      Jamel Topete is a 44 y.o. male who presents today for   Chief Complaint   Patient presents with    Cough    Chest Congestion          Mr. Topete presented to clinic today with a lingering cough, sore throat, and pleuritic chest pain he's had since visiting urgent care on 5/7. He was diagnosed with pneumonia and was given azithromycin and an Albuterol inhaler along with tessalon perles. He is reporting that he completed the 3-day course of antibiotics and with symptom improvement but the symptoms eventually returned. The cough, pleuritic pain, chest pain returned. His cough is worse. Denies fever and chills.    Cough  Associated symptoms include a sore throat. Pertinent negatives include no chest pain, chills, ear pain, fever, headaches, heartburn, hemoptysis or wheezing.        Review of Systems   Constitutional:  Negative for chills, fever and malaise/fatigue.   HENT:  Positive for sore throat. Negative for ear pain and sinus pain.    Respiratory:  Positive for cough. Negative for hemoptysis, sputum production and wheezing.         Pleuritic pain   Cardiovascular:  Negative for chest pain.        Chest discomfort   Gastrointestinal:  Positive for abdominal pain. Negative for diarrhea, heartburn, nausea and vomiting.   Genitourinary:  Negative for dysuria.   Neurological:  Negative for dizziness, weakness and headaches.        Family History:  family history includes Bone cancer in his sister; No Known Problems in his mother; Prostate cancer in his father.   Family history was reviewed with patient.     Medications:  Encounter Medications[1]    Allergies:  Review of patient's allergies indicates:  No Known Allergies    Health Maintenance:  Health Maintenance   Topic Date Due    Hepatitis C Screening  Never done    HIV Screening  Never done    Pneumococcal  "Vaccines (Age 0-49) (1 of 2 - PCV) Never done    COVID-19 Vaccine (3 - Pfizer risk series) 05/07/2021    Colorectal Cancer Screening  Never done    Influenza Vaccine (Season Ended) 09/01/2025    Hemoglobin A1c (Diabetic Prevention Screening)  09/03/2027    Lipid Panel  09/03/2029    TETANUS VACCINE  09/03/2034    RSV Vaccine (Age 60+ and Pregnant patients) (1 - 1-dose 75+ series) 11/27/2054     Health Maintenance Topics with due status: Not Due       Topic Last Completion Date    Influenza Vaccine 12/07/2020    TETANUS VACCINE 09/03/2024    Hemoglobin A1c (Diabetic Prevention Screening) 09/03/2024    Lipid Panel 09/03/2024    RSV Vaccine (Age 60+ and Pregnant patients) Not Due       Physical Exam      Vital Signs  Pulse: 108  SpO2: 98 %  BP: 132/84  BP Location: Left arm  Patient Position: Sitting  Pain Score: 0-No pain  Height and Weight  Height: 5' 7" (170.2 cm)  Weight: 84.8 kg (186 lb 15.2 oz)  BSA (Calculated - sq m): 2 sq meters  BMI (Calculated): 29.3  Weight in (lb) to have BMI = 25: 159.3]    Physical Exam  Vitals reviewed.   Constitutional:       General: He is not in acute distress.     Appearance: Normal appearance. He is normal weight.   HENT:      Head: Normocephalic and atraumatic.      Right Ear: Tympanic membrane, ear canal and external ear normal.      Left Ear: Tympanic membrane, ear canal and external ear normal.      Nose: Nose normal.      Mouth/Throat:      Mouth: Mucous membranes are dry.      Pharynx: Oropharynx is clear. Posterior oropharyngeal erythema present. No oropharyngeal exudate.   Eyes:      Extraocular Movements: Extraocular movements intact.      Conjunctiva/sclera: Conjunctivae normal.      Pupils: Pupils are equal, round, and reactive to light.   Cardiovascular:      Rate and Rhythm: Normal rate and regular rhythm.      Pulses: Normal pulses.      Heart sounds: Normal heart sounds. No murmur heard.  Pulmonary:      Effort: Pulmonary effort is normal. No respiratory distress.    "   Breath sounds: Normal breath sounds. No wheezing.   Musculoskeletal:         General: Normal range of motion.      Cervical back: Normal range of motion and neck supple.   Skin:     General: Skin is warm and dry.      Findings: No rash.   Neurological:      General: No focal deficit present.      Mental Status: He is alert and oriented to person, place, and time.   Psychiatric:         Mood and Affect: Mood normal.         Behavior: Behavior normal.         Thought Content: Thought content normal.            Assessment/Plan     Jamel Topete is a 44 y.o.male with:    Cough in adult  -     POCT Influenza A/B Rapid Antigen  -     POCT COVID-19 Rapid Screening  -     ciprofloxacin HCl (CIPRO) 750 MG tablet; Take 1 tablet (750 mg total) by mouth every 12 (twelve) hours. for 10 days  Dispense: 20 tablet; Refill: 0  -     guaiFENesin-codeine 100-10 mg/5 ml (TUSSI-ORGANIDIN NR)  mg/5 mL syrup; Take 5 mLs by mouth every 8 (eight) hours as needed for Cough.  Dispense: 118 mL; Refill: 0  -recommended adequate daily water hydration  -ibuprofen 400mg by mouth every 6 hours for throat pain  -warm soups, salt water gargle for throat discomfort      Pleuritic chest pain        -     albuterol nebulizer solution 2.5 mg (in office breathing tx)  -     guaiFENesin-codeine 100-10 mg/5 ml (TUSSI-ORGANIDIN NR)  mg/5 mL syrup; Take 5 mLs by mouth every 8 (eight) hours as needed for Cough.  Dispense: 118 mL; Refill: 0  -ibuprofen 400mg by mouth every 6 hours for the pleuritic chest pain        As above, continue current medications and maintain follow up with specialists.  Return to clinic as needed.    Greater than 50% of visit was spent face to face with patient.  All questions were answered to patient's satisfaction.          Laurie C Ray, NP-C [facility]                     [1]   Outpatient Encounter Medications as of 5/15/2025   Medication Sig Note Dispense Refill    albuterol (VENTOLIN HFA) 90 mcg/actuation  inhaler Inhale 2 puffs into the lungs every 6 (six) hours as needed for Wheezing. Rescue  18 g 0    benzonatate (TESSALON) 200 MG capsule Take 1 capsule (200 mg total) by mouth 3 (three) times daily as needed.  30 capsule 0    clobetasol 0.05% (TEMOVATE) 0.05 % Oint Apply 1 Application topically as needed.       finasteride (PROPECIA) 1 mg tablet Take 1 tablet by mouth once daily.       STELARA 45 mg/0.5 mL Syrg syringe  5/29/2016: Received from: External Pharmacy      azithromycin (ZITHROMAX) 500 MG tablet Take 1 tablet (500 mg total) by mouth once daily. (Patient not taking: Reported on 5/15/2025)  3 tablet 0    ciprofloxacin HCl (CIPRO) 750 MG tablet Take 1 tablet (750 mg total) by mouth every 12 (twelve) hours. for 10 days  20 tablet 0    guaiFENesin-codeine 100-10 mg/5 ml (TUSSI-ORGANIDIN NR)  mg/5 mL syrup Take 5 mLs by mouth every 8 (eight) hours as needed for Cough.  118 mL 0     Facility-Administered Encounter Medications as of 5/15/2025   Medication Dose Route Frequency Provider Last Rate Last Admin    [COMPLETED] albuterol nebulizer solution 2.5 mg  2.5 mg Nebulization 1 time in Clinic/HOD    2.5 mg at 05/15/25 2079

## 2025-05-27 ENCOUNTER — OFFICE VISIT (OUTPATIENT)
Dept: PRIMARY CARE CLINIC | Facility: CLINIC | Age: 45
End: 2025-05-27
Payer: COMMERCIAL

## 2025-05-27 VITALS
SYSTOLIC BLOOD PRESSURE: 112 MMHG | TEMPERATURE: 98 F | BODY MASS INDEX: 29.31 KG/M2 | WEIGHT: 186.75 LBS | RESPIRATION RATE: 18 BRPM | HEART RATE: 65 BPM | DIASTOLIC BLOOD PRESSURE: 70 MMHG | OXYGEN SATURATION: 99 % | HEIGHT: 67 IN

## 2025-05-27 DIAGNOSIS — J98.01 BRONCHOSPASM, ACUTE: Primary | ICD-10-CM

## 2025-05-27 DIAGNOSIS — F41.8 SITUATIONAL ANXIETY: ICD-10-CM

## 2025-05-27 PROCEDURE — 99214 OFFICE O/P EST MOD 30 MIN: CPT | Mod: S$GLB,,, | Performed by: FAMILY MEDICINE

## 2025-05-27 PROCEDURE — 3008F BODY MASS INDEX DOCD: CPT | Mod: CPTII,S$GLB,, | Performed by: FAMILY MEDICINE

## 2025-05-27 PROCEDURE — 99999 PR PBB SHADOW E&M-EST. PATIENT-LVL IV: CPT | Mod: PBBFAC,,, | Performed by: FAMILY MEDICINE

## 2025-05-27 PROCEDURE — 3078F DIAST BP <80 MM HG: CPT | Mod: CPTII,S$GLB,, | Performed by: FAMILY MEDICINE

## 2025-05-27 PROCEDURE — 3074F SYST BP LT 130 MM HG: CPT | Mod: CPTII,S$GLB,, | Performed by: FAMILY MEDICINE

## 2025-05-27 PROCEDURE — 1160F RVW MEDS BY RX/DR IN RCRD: CPT | Mod: CPTII,S$GLB,, | Performed by: FAMILY MEDICINE

## 2025-05-27 PROCEDURE — 1159F MED LIST DOCD IN RCRD: CPT | Mod: CPTII,S$GLB,, | Performed by: FAMILY MEDICINE

## 2025-05-27 RX ORDER — PREDNISONE 20 MG/1
TABLET ORAL
Qty: 14 TABLET | Refills: 0 | Status: SHIPPED | OUTPATIENT
Start: 2025-05-27

## 2025-05-27 RX ORDER — ALPRAZOLAM 0.25 MG/1
0.25 TABLET ORAL 2 TIMES DAILY PRN
Qty: 12 TABLET | Refills: 0 | Status: SHIPPED | OUTPATIENT
Start: 2025-05-27

## 2025-05-27 NOTE — PATIENT INSTRUCTIONS
Hydrate, rest, MucinexDM w food  Zyrtec, or Allegra   Nasal saline spray such as [place] brand as needed.  Flonase or Nasonex nasal spray after the nasal saline.    Tylenol as directed as needed for fever, body aches, headaches.   All are OTC  Most of all, stay well-hydrated.

## 2025-05-28 PROBLEM — F32.1 CURRENT MODERATE EPISODE OF MAJOR DEPRESSIVE DISORDER WITHOUT PRIOR EPISODE: Status: RESOLVED | Noted: 2024-09-04 | Resolved: 2025-05-28

## 2025-05-28 PROBLEM — J98.01 BRONCHOSPASM, ACUTE: Status: ACTIVE | Noted: 2025-05-28

## 2025-05-28 NOTE — PROGRESS NOTES
"      /70 (BP Location: Right arm, Patient Position: Sitting)   Pulse 65   Temp 98 °F (36.7 °C) (Oral)   Resp 18   Ht 5' 7" (1.702 m)   Wt 84.7 kg (186 lb 11.7 oz)   SpO2 99%   BMI 29.25 kg/m²       ===========              Jamel Topete is a 44 y.o. male           History of Present Illness    CHIEF COMPLAINT:  Mr. Topete presents with a persistent cough and difficulty breathing deeply, which has not improved significantly over the past 8-9 weeks despite previous treatments.    HPI:  Mr. 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 The cough is triggered by deep breathing, but shallow breathing does not induce it. He has coughing fits, which are repetitive and persistent, making him sound like a smoker or someone with asthma.    He was initially evaluated at an urgent care facility on the 7th, diagnosed with atypical pneumonia despite a clear chest XR, and prescribed azithromycin 500mg for 3 days, an albuterol inhaler, and Tessalon Perles. No significant improvement was noted in the following 7-8 days.    On the 15th, Tripp Dejesus evaluated him and prescribed a high dose of Cipro. Although he no longer feels like he has an infection, he still has discomfort when breathing in.    The cough is mostly unproductive, with occasional expectoration of a small amount of clear mucus. He reports swallowing a lot of mucus and increased mucus production in the mornings. The cough has been disrupting his sleep, though it has improved slightly in the last few days.    He feels exhausted after resting over the weekend. His breathing is shallow, and he is unable to take deep breaths without triggering a cough. He recalls significant but temporary relief after receiving a breathing treatment at a previous visit.    He uses an albuterol inhaler, which initially exacerbates his cough within the first " 1-2 minutes of use, but then provides some relief. He has approximately 110 puffs left in his current inhaler.    He denies significant nasal symptoms, fevers, chills, or ear pain. He reports staying well-hydrated, consistently keeping water nearby.               Patient queried and denies any further complaints      Problem List[1]    SURGICAL AND MEDICAL HISTORY: updated and reviewed.  History reviewed. No pertinent surgical history.  ALLERGIES updated and reviewed.  Review of patient's allergies indicates:  No Known Allergies    CURRENT OUTPATIENT MEDICATIONS updated and reviewed  Current Medications[2]    Review of Systems   Constitutional:  Negative for activity change, appetite change, chills, diaphoresis, fatigue, fever and unexpected weight change.   HENT:  Negative for congestion, ear discharge, ear pain, facial swelling, hearing loss, nosebleeds, postnasal drip, rhinorrhea, sinus pressure, sneezing, sore throat, tinnitus, trouble swallowing and voice change.    Eyes:  Negative for photophobia, pain, discharge, redness, itching and visual disturbance.   Respiratory:  Positive for cough. Negative for chest tightness, shortness of breath and wheezing.    Cardiovascular:  Negative for chest pain, palpitations and leg swelling.   Gastrointestinal:  Negative for abdominal distention, abdominal pain, anal bleeding, blood in stool, constipation, diarrhea, nausea, rectal pain and vomiting.   Endocrine: Negative for cold intolerance, heat intolerance, polydipsia, polyphagia and polyuria.   Genitourinary:  Negative for difficulty urinating, dysuria and flank pain.   Musculoskeletal:  Negative for arthralgias, back pain, joint swelling, myalgias and neck pain.   Skin:  Negative for rash.   Neurological:  Negative for dizziness, tremors, seizures, syncope, speech difficulty, weakness, light-headedness, numbness and headaches.   Psychiatric/Behavioral:  Negative for behavioral problems, confusion, decreased  "concentration, dysphoric mood, sleep disturbance and suicidal ideas. The patient is not nervous/anxious and is not hyperactive.        /70 (BP Location: Right arm, Patient Position: Sitting)   Pulse 65   Temp 98 °F (36.7 °C) (Oral)   Resp 18   Ht 5' 7" (1.702 m)   Wt 84.7 kg (186 lb 11.7 oz)   SpO2 99%   BMI 29.25 kg/m²   Physical Exam  Vitals and nursing note reviewed.   Constitutional:       General: He is not in acute distress.     Appearance: Normal appearance. He is well-developed. He is not ill-appearing or toxic-appearing.   HENT:      Head: Normocephalic and atraumatic.      Right Ear: Tympanic membrane, ear canal and external ear normal.      Left Ear: Tympanic membrane, ear canal and external ear normal.      Nose: Nose normal.      Mouth/Throat:      Lips: Pink.      Mouth: Mucous membranes are moist.      Pharynx: No oropharyngeal exudate or posterior oropharyngeal erythema.   Eyes:      General: No scleral icterus.        Right eye: No discharge.         Left eye: No discharge.      Extraocular Movements: Extraocular movements intact.      Conjunctiva/sclera: Conjunctivae normal.   Cardiovascular:      Rate and Rhythm: Normal rate and regular rhythm.      Pulses: Normal pulses.      Heart sounds: Normal heart sounds. No murmur heard.  Pulmonary:      Effort: Pulmonary effort is normal. No respiratory distress.      Breath sounds: Normal breath sounds. Decreased air movement present. No wheezing or rales.      Comments: Slight decreased air movement bilaterally without wheezes in any lung fields.  No crackles.  No coarse breath sounds.  Abdominal:      General: Bowel sounds are normal. There is no distension.      Palpations: Abdomen is soft. There is no mass.      Tenderness: There is no abdominal tenderness. There is no right CVA tenderness, left CVA tenderness, guarding or rebound.      Hernia: No hernia is present.   Musculoskeletal:      Cervical back: Normal range of motion and neck " "supple. No rigidity or tenderness.   Lymphadenopathy:      Cervical: No cervical adenopathy.   Skin:     General: Skin is warm and dry.   Neurological:      General: No focal deficit present.      Mental Status: He is alert. Mental status is at baseline.   Psychiatric:         Mood and Affect: Mood normal.         Behavior: Behavior normal. Behavior is cooperative.           ASSESSMENT/PLAN    Assessment & Plan    IMPRESSION:  - Persistent cough and shallow breathing after previous treatments suggest need for more aggressive intervention.  - Suspect ongoing post-nasal drip causing bronchospasms and irritation.  - Initiated steroid therapy to reduce inflammation, relax bronchial muscles, and improve breathing.  - Recommend intensified use of albuterol inhaler to provide immediate bronchodilation.  - Considered possibility of asthma recurrence, but not definitively diagnosed at this time.    Acute bronchospasm.  - Mr. Topete reports persistent dry cough for 8-9 weeks that worsens with deep breaths and is mostly unproductive, with only slight improvement.  - Assessed as involuntary and likely related to post-nasal drip causing bronchospasms.  - recommended OTC Mucinex DM to be taken with food after explaining the origin and effects of dextromethorphan in cough suppressants.      OTC Zyrtec or Allegra daily for it least 2 weeks.  If somnolent with Zyrtec then moved to Allegra     Nasal saline t.i.d. to q.i.d.     Flonase as directed.  Technique on use demonstrated.    Albuterol meter dose inhaler--she has 1--2 puffs q.4 hours while awake for several days then 1-2 puffs q.4 hours as needed         ## CHRONIC NASOPHARYNGITIS:  - Mr. Topete experiences post-nasal drip with mucus swallowing, especially in mornings.  - This nasal drip is contributing to cough and bronchospasms.  - Prescribed Flonase nasal spray and advised on proper "bank shot" technique for maximum effectiveness (aiming towards ear tips).  - Recommend nasal " saline spray to clear nasal passages.    Prednisone taper.  See prescriptions.  Side effects discussed including anxiety     Brief PRN Xanax to go along with the prednisone as needed.  Side effects discussed including possible somnolence     If no improvement in 5-6 days he should let us know and we will do an inhaled corticosteroid and consider reimaging etcetera.  Inhaled corticosteroids can be quite cost prohibitive.  I think if he is aggressive with the above he will continue to improve steadily.                Most recent some lab results reviewed with patient.  Any new prescription medications gone over in detail including reason for taking the medication, most common possible side effects and possible costs, etcetera.    Chronic conditions updated. Other than changes or additions as above, cont current medications and maintain follow-up with specialists if indicated.     - Cautioned the patient against excessive use of internet searches for interpreting results before professional review.     Dyllan Lopez MD    Disclaimer:  This clinical note was created with the assistance of [employer], an AI-powered documentation tool.  Portions of this note may also have been dictated with the assistance of a computer-assisted dictation program.  While the healthcare provider has reviewed the content, AI-assisted documentation and/or dictation programs may contain inadvertent errors or omissions.  Patients are encouraged to discuss questions or clarifications regarding their care directly with the provider.                         [1]   Patient Active Problem List  Diagnosis    Low back pain    Psoriasis    Cough    Atypical pneumonia    Pleuritic chest pain    Bronchospasm, acute   [2]   Current Outpatient Medications:     albuterol (VENTOLIN HFA) 90 mcg/actuation inhaler, Inhale 2 puffs into the lungs every 6 (six) hours as needed for Wheezing. Rescue, Disp: 18 g, Rfl: 0    clobetasol 0.05% (TEMOVATE) 0.05 % Oint,  Apply 1 Application topically as needed., Disp: , Rfl:     finasteride (PROPECIA) 1 mg tablet, Take 1 tablet by mouth once daily., Disp: , Rfl:     ALPRAZolam (XANAX) 0.25 MG tablet, Take 1 tablet (0.25 mg total) by mouth 2 (two) times daily as needed for Anxiety., Disp: 12 tablet, Rfl: 0    predniSONE (DELTASONE) 20 MG tablet, 2 tabs daily for 2 days then 1 tab daily for 7 days then 1/2 tab for 6 days then stop, Disp: 14 tablet, Rfl: 0    STELARA 45 mg/0.5 mL Syrg syringe, , Disp: , Rfl: